# Patient Record
Sex: FEMALE | Race: WHITE | NOT HISPANIC OR LATINO | URBAN - METROPOLITAN AREA
[De-identification: names, ages, dates, MRNs, and addresses within clinical notes are randomized per-mention and may not be internally consistent; named-entity substitution may affect disease eponyms.]

---

## 2017-02-01 ENCOUNTER — OFFICE VISIT - HEALTHEAST (OUTPATIENT)
Dept: FAMILY MEDICINE | Facility: CLINIC | Age: 47
End: 2017-02-01

## 2017-02-01 DIAGNOSIS — M25.561 KNEE PAIN, BILATERAL: ICD-10-CM

## 2017-02-01 DIAGNOSIS — F33.0 DEPRESSION, MAJOR, RECURRENT, MILD (H): ICD-10-CM

## 2017-02-01 DIAGNOSIS — L40.50 PSORIATIC ARTHRITIS (H): ICD-10-CM

## 2017-02-01 DIAGNOSIS — M25.562 KNEE PAIN, BILATERAL: ICD-10-CM

## 2017-02-01 ASSESSMENT — MIFFLIN-ST. JEOR: SCORE: 1352.88

## 2017-02-19 ENCOUNTER — COMMUNICATION - HEALTHEAST (OUTPATIENT)
Dept: FAMILY MEDICINE | Facility: CLINIC | Age: 47
End: 2017-02-19

## 2017-03-08 ENCOUNTER — OFFICE VISIT - HEALTHEAST (OUTPATIENT)
Dept: FAMILY MEDICINE | Facility: CLINIC | Age: 47
End: 2017-03-08

## 2017-03-08 DIAGNOSIS — N92.0 MENORRHAGIA WITH REGULAR CYCLE: ICD-10-CM

## 2017-03-08 DIAGNOSIS — F33.0 DEPRESSION, MAJOR, RECURRENT, MILD (H): ICD-10-CM

## 2017-03-08 DIAGNOSIS — N89.8 VAGINAL DISCHARGE: ICD-10-CM

## 2017-03-08 DIAGNOSIS — Z12.4 SCREENING FOR CERVICAL CANCER: ICD-10-CM

## 2017-03-08 DIAGNOSIS — Z79.899 HIGH RISK MEDICATION USE: ICD-10-CM

## 2017-03-08 DIAGNOSIS — E55.9 VITAMIN D DEFICIENCY: ICD-10-CM

## 2017-03-08 DIAGNOSIS — Z00.00 ROUTINE GENERAL MEDICAL EXAMINATION AT A HEALTH CARE FACILITY: ICD-10-CM

## 2017-03-08 DIAGNOSIS — Z83.49 FAMILY HISTORY OF HYPOTHYROIDISM: ICD-10-CM

## 2017-03-08 DIAGNOSIS — Z23 NEED FOR VACCINATION: ICD-10-CM

## 2017-03-08 DIAGNOSIS — E78.2 MIXED HYPERLIPIDEMIA: ICD-10-CM

## 2017-03-08 DIAGNOSIS — L40.50 PSORIATIC ARTHRITIS (H): ICD-10-CM

## 2017-03-08 LAB
CHOLEST SERPL-MCNC: 264 MG/DL
FASTING STATUS PATIENT QL REPORTED: YES
HDLC SERPL-MCNC: 48 MG/DL
LDLC SERPL CALC-MCNC: 180 MG/DL
TRIGL SERPL-MCNC: 182 MG/DL

## 2017-03-08 ASSESSMENT — MIFFLIN-ST. JEOR: SCORE: 1376.12

## 2017-03-13 LAB
HPV INTERPRETATION - HISTORICAL: NORMAL
HPV INTERPRETER - HISTORICAL: NORMAL

## 2017-04-12 ENCOUNTER — OFFICE VISIT - HEALTHEAST (OUTPATIENT)
Dept: RHEUMATOLOGY | Facility: CLINIC | Age: 47
End: 2017-04-12

## 2017-04-12 DIAGNOSIS — L40.9 PSORIASIS: ICD-10-CM

## 2017-04-12 DIAGNOSIS — M25.50 POLYARTHRALGIA: ICD-10-CM

## 2017-04-12 DIAGNOSIS — Z79.899 HIGH RISK MEDICATION USE: ICD-10-CM

## 2017-04-12 DIAGNOSIS — L40.50 PSORIATIC ARTHRITIS (H): ICD-10-CM

## 2017-04-12 LAB
ALT SERPL W P-5'-P-CCNC: 20 U/L (ref 0–45)
CREAT SERPL-MCNC: 0.66 MG/DL (ref 0.6–1.1)
GFR SERPL CREATININE-BSD FRML MDRD: >60 ML/MIN/1.73M2

## 2017-06-20 ENCOUNTER — AMBULATORY - HEALTHEAST (OUTPATIENT)
Dept: LAB | Facility: CLINIC | Age: 47
End: 2017-06-20

## 2017-06-20 DIAGNOSIS — Z79.899 HIGH RISK MEDICATION USE: ICD-10-CM

## 2017-06-20 DIAGNOSIS — M25.50 POLYARTHRALGIA: ICD-10-CM

## 2017-06-20 DIAGNOSIS — L40.50 PSORIATIC ARTHRITIS (H): ICD-10-CM

## 2017-06-20 LAB
ALT SERPL W P-5'-P-CCNC: 19 U/L (ref 0–45)
CREAT SERPL-MCNC: 0.66 MG/DL (ref 0.6–1.1)
GFR SERPL CREATININE-BSD FRML MDRD: >60 ML/MIN/1.73M2

## 2017-07-31 ENCOUNTER — OFFICE VISIT - HEALTHEAST (OUTPATIENT)
Dept: FAMILY MEDICINE | Facility: CLINIC | Age: 47
End: 2017-07-31

## 2017-07-31 DIAGNOSIS — M19.90 ARTHRITIS: ICD-10-CM

## 2017-07-31 DIAGNOSIS — F33.0 DEPRESSION, MAJOR, RECURRENT, MILD (H): ICD-10-CM

## 2017-07-31 DIAGNOSIS — Z12.31 ENCOUNTER FOR SCREENING MAMMOGRAM FOR BREAST CANCER: ICD-10-CM

## 2017-07-31 DIAGNOSIS — R53.83 FATIGUE: ICD-10-CM

## 2017-07-31 ASSESSMENT — MIFFLIN-ST. JEOR: SCORE: 1373.57

## 2017-08-02 ENCOUNTER — AMBULATORY - HEALTHEAST (OUTPATIENT)
Dept: FAMILY MEDICINE | Facility: CLINIC | Age: 47
End: 2017-08-02

## 2017-08-17 ENCOUNTER — OFFICE VISIT - HEALTHEAST (OUTPATIENT)
Dept: RHEUMATOLOGY | Facility: CLINIC | Age: 47
End: 2017-08-17

## 2017-08-17 DIAGNOSIS — L40.50 PSORIATIC ARTHRITIS (H): ICD-10-CM

## 2017-08-17 DIAGNOSIS — Z79.899 HIGH RISK MEDICATION USE: ICD-10-CM

## 2017-08-17 DIAGNOSIS — L40.9 PSORIASIS: ICD-10-CM

## 2017-08-17 DIAGNOSIS — M25.50 POLYARTHRALGIA: ICD-10-CM

## 2017-08-17 LAB
ALT SERPL W P-5'-P-CCNC: 17 U/L (ref 0–45)
CREAT SERPL-MCNC: 0.67 MG/DL (ref 0.6–1.1)
GFR SERPL CREATININE-BSD FRML MDRD: >60 ML/MIN/1.73M2

## 2017-08-17 ASSESSMENT — MIFFLIN-ST. JEOR: SCORE: 1385.64

## 2017-10-11 ENCOUNTER — AMBULATORY - HEALTHEAST (OUTPATIENT)
Dept: LAB | Facility: CLINIC | Age: 47
End: 2017-10-11

## 2017-10-11 DIAGNOSIS — L40.50 PSORIATIC ARTHRITIS (H): ICD-10-CM

## 2017-10-11 DIAGNOSIS — Z79.899 HIGH RISK MEDICATION USE: ICD-10-CM

## 2017-10-11 DIAGNOSIS — M25.50 POLYARTHRALGIA: ICD-10-CM

## 2017-10-11 LAB
ALT SERPL W P-5'-P-CCNC: 18 U/L (ref 0–45)
CREAT SERPL-MCNC: 0.7 MG/DL (ref 0.6–1.1)
GFR SERPL CREATININE-BSD FRML MDRD: >60 ML/MIN/1.73M2

## 2017-11-30 ENCOUNTER — OFFICE VISIT - HEALTHEAST (OUTPATIENT)
Dept: FAMILY MEDICINE | Facility: CLINIC | Age: 47
End: 2017-11-30

## 2017-11-30 ENCOUNTER — AMBULATORY - HEALTHEAST (OUTPATIENT)
Dept: FAMILY MEDICINE | Facility: CLINIC | Age: 47
End: 2017-11-30

## 2017-11-30 ENCOUNTER — COMMUNICATION - HEALTHEAST (OUTPATIENT)
Dept: FAMILY MEDICINE | Facility: CLINIC | Age: 47
End: 2017-11-30

## 2017-11-30 ENCOUNTER — COMMUNICATION - HEALTHEAST (OUTPATIENT)
Dept: RHEUMATOLOGY | Facility: CLINIC | Age: 47
End: 2017-11-30

## 2017-11-30 DIAGNOSIS — L71.9 ROSACEA: ICD-10-CM

## 2017-11-30 DIAGNOSIS — L40.50 PSORIATIC ARTHRITIS (H): ICD-10-CM

## 2017-11-30 DIAGNOSIS — H61.23 EXCESSIVE EAR WAX, BILATERAL: ICD-10-CM

## 2017-11-30 DIAGNOSIS — E55.9 VITAMIN D DEFICIENCY: ICD-10-CM

## 2017-11-30 DIAGNOSIS — E66.9 OBESITY: ICD-10-CM

## 2017-11-30 ASSESSMENT — MIFFLIN-ST. JEOR: SCORE: 1367.05

## 2017-12-27 ENCOUNTER — COMMUNICATION - HEALTHEAST (OUTPATIENT)
Dept: RHEUMATOLOGY | Facility: CLINIC | Age: 47
End: 2017-12-27

## 2017-12-28 ENCOUNTER — COMMUNICATION - HEALTHEAST (OUTPATIENT)
Dept: ADMINISTRATIVE | Facility: CLINIC | Age: 47
End: 2017-12-28

## 2017-12-28 ENCOUNTER — COMMUNICATION - HEALTHEAST (OUTPATIENT)
Dept: RHEUMATOLOGY | Facility: CLINIC | Age: 47
End: 2017-12-28

## 2018-01-03 ENCOUNTER — AMBULATORY - HEALTHEAST (OUTPATIENT)
Dept: LAB | Facility: CLINIC | Age: 48
End: 2018-01-03

## 2018-01-03 DIAGNOSIS — Z79.899 HIGH RISK MEDICATION USE: ICD-10-CM

## 2018-01-03 DIAGNOSIS — M25.50 POLYARTHRALGIA: ICD-10-CM

## 2018-01-03 DIAGNOSIS — L40.50 PSORIATIC ARTHRITIS (H): ICD-10-CM

## 2018-01-03 LAB
ALBUMIN SERPL-MCNC: 3.8 G/DL (ref 3.5–5)
ALT SERPL W P-5'-P-CCNC: 22 U/L (ref 0–45)
CREAT SERPL-MCNC: 0.65 MG/DL (ref 0.6–1.1)
ERYTHROCYTE [DISTWIDTH] IN BLOOD BY AUTOMATED COUNT: 13.1 % (ref 11–14.5)
GFR SERPL CREATININE-BSD FRML MDRD: >60 ML/MIN/1.73M2
HCT VFR BLD AUTO: 40.5 % (ref 35–47)
HGB BLD-MCNC: 13.2 G/DL (ref 12–16)
MCH RBC QN AUTO: 28.4 PG (ref 27–34)
MCHC RBC AUTO-ENTMCNC: 32.6 G/DL (ref 32–36)
MCV RBC AUTO: 87 FL (ref 80–100)
PLATELET # BLD AUTO: 203 THOU/UL (ref 140–440)
PMV BLD AUTO: 6.5 FL (ref 7–10)
RBC # BLD AUTO: 4.65 MILL/UL (ref 3.8–5.4)
WBC: 6.2 THOU/UL (ref 4–11)

## 2018-01-30 ENCOUNTER — COMMUNICATION - HEALTHEAST (OUTPATIENT)
Dept: FAMILY MEDICINE | Facility: CLINIC | Age: 48
End: 2018-01-30

## 2018-01-30 DIAGNOSIS — L71.9 ROSACEA: ICD-10-CM

## 2018-02-01 ENCOUNTER — COMMUNICATION - HEALTHEAST (OUTPATIENT)
Dept: RHEUMATOLOGY | Facility: CLINIC | Age: 48
End: 2018-02-01

## 2018-02-01 DIAGNOSIS — L40.50 PSORIATIC ARTHRITIS (H): ICD-10-CM

## 2018-03-30 ENCOUNTER — HOSPITAL ENCOUNTER (OUTPATIENT)
Dept: MAMMOGRAPHY | Facility: CLINIC | Age: 48
Discharge: HOME | End: 2018-03-30
Attending: FAMILY MEDICINE

## 2018-03-30 DIAGNOSIS — Z12.31 ENCOUNTER FOR SCREENING MAMMOGRAM FOR BREAST CANCER: ICD-10-CM

## 2018-06-06 ENCOUNTER — COMMUNICATION - HEALTHEAST (OUTPATIENT)
Dept: RHEUMATOLOGY | Facility: CLINIC | Age: 48
End: 2018-06-06

## 2018-06-06 DIAGNOSIS — L40.50 PSORIATIC ARTHRITIS (H): ICD-10-CM

## 2018-07-03 ENCOUNTER — OFFICE VISIT - HEALTHEAST (OUTPATIENT)
Dept: RHEUMATOLOGY | Facility: CLINIC | Age: 48
End: 2018-07-03

## 2018-07-03 DIAGNOSIS — M25.50 POLYARTHRALGIA: ICD-10-CM

## 2018-07-03 DIAGNOSIS — L40.50 PSORIATIC ARTHRITIS (H): ICD-10-CM

## 2018-07-03 DIAGNOSIS — L40.9 PSORIASIS: ICD-10-CM

## 2018-07-03 LAB
C REACTIVE PROTEIN LHE: 0.9 MG/DL (ref 0–0.8)
ERYTHROCYTE [SEDIMENTATION RATE] IN BLOOD BY WESTERGREN METHOD: 7 MM/HR (ref 0–20)

## 2018-07-20 ENCOUNTER — OFFICE VISIT - HEALTHEAST (OUTPATIENT)
Dept: FAMILY MEDICINE | Facility: CLINIC | Age: 48
End: 2018-07-20

## 2018-07-20 DIAGNOSIS — E55.9 VITAMIN D DEFICIENCY: ICD-10-CM

## 2018-07-20 DIAGNOSIS — Z13.220 SCREENING, LIPID: ICD-10-CM

## 2018-07-20 DIAGNOSIS — R09.A2 GLOBUS SENSATION: ICD-10-CM

## 2018-07-20 DIAGNOSIS — L71.9 ROSACEA: ICD-10-CM

## 2018-07-20 DIAGNOSIS — F33.0 DEPRESSION, MAJOR, RECURRENT, MILD (H): ICD-10-CM

## 2018-07-20 DIAGNOSIS — L40.9 PSORIASIS: ICD-10-CM

## 2018-07-20 DIAGNOSIS — Z00.00 ROUTINE GENERAL MEDICAL EXAMINATION AT A HEALTH CARE FACILITY: ICD-10-CM

## 2018-07-20 DIAGNOSIS — R00.2 PALPITATIONS: ICD-10-CM

## 2018-07-20 LAB
ALBUMIN SERPL-MCNC: 4.4 G/DL (ref 3.5–5)
ALP SERPL-CCNC: 62 U/L (ref 45–120)
ALT SERPL W P-5'-P-CCNC: 21 U/L (ref 0–45)
ANION GAP SERPL CALCULATED.3IONS-SCNC: 9 MMOL/L (ref 5–18)
AST SERPL W P-5'-P-CCNC: 17 U/L (ref 0–40)
ATRIAL RATE - MUSE: 59 BPM
BILIRUB SERPL-MCNC: 0.4 MG/DL (ref 0–1)
BUN SERPL-MCNC: 12 MG/DL (ref 8–22)
CALCIUM SERPL-MCNC: 9.5 MG/DL (ref 8.5–10.5)
CHLORIDE BLD-SCNC: 106 MMOL/L (ref 98–107)
CHOLEST SERPL-MCNC: 238 MG/DL
CO2 SERPL-SCNC: 25 MMOL/L (ref 22–31)
CREAT SERPL-MCNC: 0.72 MG/DL (ref 0.6–1.1)
DIASTOLIC BLOOD PRESSURE - MUSE: NORMAL MMHG
ERYTHROCYTE [DISTWIDTH] IN BLOOD BY AUTOMATED COUNT: 13 % (ref 11–14.5)
FASTING STATUS PATIENT QL REPORTED: YES
GFR SERPL CREATININE-BSD FRML MDRD: >60 ML/MIN/1.73M2
GLUCOSE BLD-MCNC: 88 MG/DL (ref 70–125)
HCT VFR BLD AUTO: 42.7 % (ref 35–47)
HDLC SERPL-MCNC: 42 MG/DL
HGB BLD-MCNC: 14 G/DL (ref 12–16)
INTERPRETATION ECG - MUSE: NORMAL
LDLC SERPL CALC-MCNC: 165 MG/DL
MAGNESIUM SERPL-MCNC: 2.4 MG/DL (ref 1.8–2.6)
MCH RBC QN AUTO: 28.9 PG (ref 27–34)
MCHC RBC AUTO-ENTMCNC: 32.8 G/DL (ref 32–36)
MCV RBC AUTO: 88 FL (ref 80–100)
P AXIS - MUSE: 3 DEGREES
PLATELET # BLD AUTO: 216 THOU/UL (ref 140–440)
PMV BLD AUTO: 6.9 FL (ref 7–10)
POTASSIUM BLD-SCNC: 4.5 MMOL/L (ref 3.5–5)
PR INTERVAL - MUSE: 134 MS
PROT SERPL-MCNC: 7 G/DL (ref 6–8)
QRS DURATION - MUSE: 64 MS
QT - MUSE: 436 MS
QTC - MUSE: 431 MS
R AXIS - MUSE: 4 DEGREES
RBC # BLD AUTO: 4.86 MILL/UL (ref 3.8–5.4)
SODIUM SERPL-SCNC: 140 MMOL/L (ref 136–145)
SYSTOLIC BLOOD PRESSURE - MUSE: NORMAL MMHG
T AXIS - MUSE: 22 DEGREES
TRIGL SERPL-MCNC: 156 MG/DL
TSH SERPL DL<=0.005 MIU/L-ACNC: 1.4 UIU/ML (ref 0.3–5)
VENTRICULAR RATE- MUSE: 59 BPM
WBC: 6.2 THOU/UL (ref 4–11)

## 2018-07-20 ASSESSMENT — MIFFLIN-ST. JEOR: SCORE: 1356.61

## 2018-07-23 LAB
25(OH)D3 SERPL-MCNC: 28.5 NG/ML (ref 30–80)
25(OH)D3 SERPL-MCNC: 28.5 NG/ML (ref 30–80)

## 2018-10-08 ENCOUNTER — OFFICE VISIT - HEALTHEAST (OUTPATIENT)
Dept: RHEUMATOLOGY | Facility: CLINIC | Age: 48
End: 2018-10-08

## 2018-10-08 DIAGNOSIS — L40.9 PSORIASIS: ICD-10-CM

## 2018-10-08 DIAGNOSIS — M25.50 POLYARTHRALGIA: ICD-10-CM

## 2018-10-08 ASSESSMENT — MIFFLIN-ST. JEOR: SCORE: 1358.88

## 2019-02-19 ENCOUNTER — OFFICE VISIT - HEALTHEAST (OUTPATIENT)
Dept: FAMILY MEDICINE | Facility: CLINIC | Age: 49
End: 2019-02-19

## 2019-02-19 DIAGNOSIS — F33.0 DEPRESSION, MAJOR, RECURRENT, MILD (H): ICD-10-CM

## 2019-02-19 DIAGNOSIS — R35.0 URINARY FREQUENCY: ICD-10-CM

## 2019-02-19 DIAGNOSIS — K21.00 GASTROESOPHAGEAL REFLUX DISEASE WITH ESOPHAGITIS: ICD-10-CM

## 2019-02-19 DIAGNOSIS — E55.9 VITAMIN D DEFICIENCY: ICD-10-CM

## 2019-02-19 LAB
ALBUMIN UR-MCNC: NEGATIVE MG/DL
APPEARANCE UR: ABNORMAL
BILIRUB UR QL STRIP: NEGATIVE
COLOR UR AUTO: YELLOW
ERYTHROCYTE [DISTWIDTH] IN BLOOD BY AUTOMATED COUNT: 12.3 % (ref 11–14.5)
GLUCOSE UR STRIP-MCNC: NEGATIVE MG/DL
HCT VFR BLD AUTO: 43.1 % (ref 35–47)
HGB BLD-MCNC: 13.9 G/DL (ref 12–16)
HGB UR QL STRIP: NEGATIVE
KETONES UR STRIP-MCNC: NEGATIVE MG/DL
LEUKOCYTE ESTERASE UR QL STRIP: NEGATIVE
MCH RBC QN AUTO: 28.5 PG (ref 27–34)
MCHC RBC AUTO-ENTMCNC: 32.3 G/DL (ref 32–36)
MCV RBC AUTO: 88 FL (ref 80–100)
NITRATE UR QL: NEGATIVE
PH UR STRIP: 8.5 [PH] (ref 5–8)
PLATELET # BLD AUTO: 253 THOU/UL (ref 140–440)
PMV BLD AUTO: 6.4 FL (ref 7–10)
RBC # BLD AUTO: 4.88 MILL/UL (ref 3.8–5.4)
SP GR UR STRIP: 1.01 (ref 1–1.03)
UROBILINOGEN UR STRIP-ACNC: ABNORMAL
WBC: 8.5 THOU/UL (ref 4–11)

## 2019-02-19 RX ORDER — BIOTIN 1 MG
1000 TABLET ORAL DAILY
Status: SHIPPED | COMMUNITY
Start: 2019-02-19

## 2019-02-19 RX ORDER — LANOLIN ALCOHOL/MO/W.PET/CERES
400 CREAM (GRAM) TOPICAL DAILY
Status: SHIPPED | COMMUNITY
Start: 2019-02-19

## 2019-02-19 RX ORDER — FERROUS SULFATE 325(65) MG
1 TABLET ORAL
Status: SHIPPED | COMMUNITY
Start: 2019-02-19

## 2019-02-20 LAB
25(OH)D3 SERPL-MCNC: 40.2 NG/ML (ref 30–80)
25(OH)D3 SERPL-MCNC: 40.2 NG/ML (ref 30–80)
BACTERIA SPEC CULT: NO GROWTH

## 2019-10-01 ENCOUNTER — OFFICE VISIT - HEALTHEAST (OUTPATIENT)
Dept: FAMILY MEDICINE | Facility: CLINIC | Age: 49
End: 2019-10-01

## 2019-10-01 DIAGNOSIS — E55.9 VITAMIN D DEFICIENCY: ICD-10-CM

## 2019-10-01 DIAGNOSIS — H61.23 EXCESSIVE EAR WAX, BILATERAL: ICD-10-CM

## 2019-10-01 DIAGNOSIS — Z13.220 SCREENING, LIPID: ICD-10-CM

## 2019-10-01 DIAGNOSIS — R09.A2 GLOBUS SENSATION: ICD-10-CM

## 2019-10-01 DIAGNOSIS — K21.00 GASTROESOPHAGEAL REFLUX DISEASE WITH ESOPHAGITIS: ICD-10-CM

## 2019-10-01 DIAGNOSIS — Z12.31 VISIT FOR SCREENING MAMMOGRAM: ICD-10-CM

## 2019-10-01 DIAGNOSIS — L65.9 HAIR LOSS: ICD-10-CM

## 2019-10-01 DIAGNOSIS — R06.02 SHORT OF BREATH ON EXERTION: ICD-10-CM

## 2019-10-01 DIAGNOSIS — Z00.00 ROUTINE GENERAL MEDICAL EXAMINATION AT A HEALTH CARE FACILITY: ICD-10-CM

## 2019-10-01 LAB
ALBUMIN SERPL-MCNC: 4.4 G/DL (ref 3.5–5)
ALP SERPL-CCNC: 76 U/L (ref 45–120)
ALT SERPL W P-5'-P-CCNC: 16 U/L (ref 0–45)
ANION GAP SERPL CALCULATED.3IONS-SCNC: 12 MMOL/L (ref 5–18)
AST SERPL W P-5'-P-CCNC: 14 U/L (ref 0–40)
BILIRUB SERPL-MCNC: 0.3 MG/DL (ref 0–1)
BUN SERPL-MCNC: 16 MG/DL (ref 8–22)
CALCIUM SERPL-MCNC: 9.9 MG/DL (ref 8.5–10.5)
CHLORIDE BLD-SCNC: 105 MMOL/L (ref 98–107)
CHOLEST SERPL-MCNC: 280 MG/DL
CO2 SERPL-SCNC: 24 MMOL/L (ref 22–31)
CREAT SERPL-MCNC: 0.69 MG/DL (ref 0.6–1.1)
ERYTHROCYTE [DISTWIDTH] IN BLOOD BY AUTOMATED COUNT: 12.4 % (ref 11–14.5)
FASTING STATUS PATIENT QL REPORTED: NO
FERRITIN SERPL-MCNC: 50 NG/ML (ref 10–130)
GFR SERPL CREATININE-BSD FRML MDRD: >60 ML/MIN/1.73M2
GLUCOSE BLD-MCNC: 82 MG/DL (ref 70–125)
HCT VFR BLD AUTO: 42.7 % (ref 35–47)
HDLC SERPL-MCNC: 49 MG/DL
HGB BLD-MCNC: 14.8 G/DL (ref 12–16)
IRON SATN MFR SERPL: 25 % (ref 20–50)
IRON SERPL-MCNC: 87 UG/DL (ref 42–175)
LDLC SERPL CALC-MCNC: 180 MG/DL
MAGNESIUM SERPL-MCNC: 2 MG/DL (ref 1.8–2.6)
MCH RBC QN AUTO: 30.1 PG (ref 27–34)
MCHC RBC AUTO-ENTMCNC: 34.6 G/DL (ref 32–36)
MCV RBC AUTO: 87 FL (ref 80–100)
PLATELET # BLD AUTO: 233 THOU/UL (ref 140–440)
PMV BLD AUTO: 7 FL (ref 7–10)
POTASSIUM BLD-SCNC: 4.4 MMOL/L (ref 3.5–5)
PROT SERPL-MCNC: 7.2 G/DL (ref 6–8)
RBC # BLD AUTO: 4.9 MILL/UL (ref 3.8–5.4)
SODIUM SERPL-SCNC: 141 MMOL/L (ref 136–145)
TIBC SERPL-MCNC: 344 UG/DL (ref 313–563)
TRANSFERRIN SERPL-MCNC: 275 MG/DL (ref 212–360)
TRIGL SERPL-MCNC: 254 MG/DL
TSH SERPL DL<=0.005 MIU/L-ACNC: 0.97 UIU/ML (ref 0.3–5)
WBC: 6.5 THOU/UL (ref 4–11)

## 2019-10-01 RX ORDER — FAMOTIDINE 10 MG
10 TABLET ORAL 2 TIMES DAILY PRN
Qty: 60 TABLET | Refills: 5 | Status: SHIPPED | OUTPATIENT
Start: 2019-10-01

## 2019-10-01 ASSESSMENT — ANXIETY QUESTIONNAIRES
6. BECOMING EASILY ANNOYED OR IRRITABLE: SEVERAL DAYS
7. FEELING AFRAID AS IF SOMETHING AWFUL MIGHT HAPPEN: SEVERAL DAYS
5. BEING SO RESTLESS THAT IT IS HARD TO SIT STILL: SEVERAL DAYS
4. TROUBLE RELAXING: SEVERAL DAYS
IF YOU CHECKED OFF ANY PROBLEMS ON THIS QUESTIONNAIRE, HOW DIFFICULT HAVE THESE PROBLEMS MADE IT FOR YOU TO DO YOUR WORK, TAKE CARE OF THINGS AT HOME, OR GET ALONG WITH OTHER PEOPLE: SOMEWHAT DIFFICULT
1. FEELING NERVOUS, ANXIOUS, OR ON EDGE: SEVERAL DAYS
2. NOT BEING ABLE TO STOP OR CONTROL WORRYING: SEVERAL DAYS
GAD7 TOTAL SCORE: 7
3. WORRYING TOO MUCH ABOUT DIFFERENT THINGS: SEVERAL DAYS

## 2019-10-01 ASSESSMENT — PATIENT HEALTH QUESTIONNAIRE - PHQ9: SUM OF ALL RESPONSES TO PHQ QUESTIONS 1-9: 7

## 2019-10-01 ASSESSMENT — MIFFLIN-ST. JEOR: SCORE: 1343.23

## 2019-10-02 LAB
25(OH)D3 SERPL-MCNC: 30.9 NG/ML (ref 30–80)
25(OH)D3 SERPL-MCNC: 30.9 NG/ML (ref 30–80)

## 2019-10-09 ENCOUNTER — COMMUNICATION - HEALTHEAST (OUTPATIENT)
Dept: ADMINISTRATIVE | Facility: CLINIC | Age: 49
End: 2019-10-09

## 2019-10-25 ENCOUNTER — OFFICE VISIT - HEALTHEAST (OUTPATIENT)
Dept: OTOLARYNGOLOGY | Facility: CLINIC | Age: 49
End: 2019-10-25

## 2019-10-25 DIAGNOSIS — K21.9 LPRD (LARYNGOPHARYNGEAL REFLUX DISEASE): ICD-10-CM

## 2019-10-25 DIAGNOSIS — H61.23 BILATERAL IMPACTED CERUMEN: ICD-10-CM

## 2019-10-25 DIAGNOSIS — J37.0 CHRONIC LARYNGITIS: ICD-10-CM

## 2020-03-25 ENCOUNTER — COMMUNICATION - HEALTHEAST (OUTPATIENT)
Dept: FAMILY MEDICINE | Facility: CLINIC | Age: 50
End: 2020-03-25

## 2020-03-27 ENCOUNTER — COMMUNICATION - HEALTHEAST (OUTPATIENT)
Dept: FAMILY MEDICINE | Facility: CLINIC | Age: 50
End: 2020-03-27

## 2020-03-27 DIAGNOSIS — L40.9 PSORIASIS: ICD-10-CM

## 2020-03-30 RX ORDER — TRIAMCINOLONE ACETONIDE 1 MG/G
OINTMENT TOPICAL
Qty: 30 G | Refills: 1 | Status: SHIPPED | OUTPATIENT
Start: 2020-03-30

## 2020-03-31 ENCOUNTER — COMMUNICATION - HEALTHEAST (OUTPATIENT)
Dept: FAMILY MEDICINE | Facility: CLINIC | Age: 50
End: 2020-03-31

## 2020-03-31 DIAGNOSIS — L65.9 HAIR LOSS: ICD-10-CM

## 2020-03-31 RX ORDER — SPIRONOLACTONE 50 MG/1
50 TABLET, FILM COATED ORAL DAILY
Qty: 90 TABLET | Refills: 2 | Status: SHIPPED | OUTPATIENT
Start: 2020-03-31

## 2021-05-26 ASSESSMENT — PATIENT HEALTH QUESTIONNAIRE - PHQ9: SUM OF ALL RESPONSES TO PHQ QUESTIONS 1-9: 7

## 2021-05-28 ASSESSMENT — ANXIETY QUESTIONNAIRES: GAD7 TOTAL SCORE: 7

## 2021-05-30 VITALS — WEIGHT: 184 LBS | HEIGHT: 60 IN | BODY MASS INDEX: 36.12 KG/M2

## 2021-05-30 VITALS — WEIGHT: 187.8 LBS | BODY MASS INDEX: 36.68 KG/M2

## 2021-05-30 VITALS — HEIGHT: 59 IN | BODY MASS INDEX: 36.77 KG/M2 | WEIGHT: 182.38 LBS

## 2021-05-31 VITALS — HEIGHT: 60 IN | BODY MASS INDEX: 36.53 KG/M2 | WEIGHT: 186.1 LBS

## 2021-05-31 VITALS — HEIGHT: 60 IN | WEIGHT: 183.44 LBS | BODY MASS INDEX: 36.02 KG/M2

## 2021-05-31 VITALS — BODY MASS INDEX: 35.73 KG/M2 | WEIGHT: 182 LBS | HEIGHT: 60 IN

## 2021-06-01 VITALS — BODY MASS INDEX: 35.28 KG/M2 | HEIGHT: 60 IN | WEIGHT: 179.7 LBS

## 2021-06-01 VITALS — BODY MASS INDEX: 35.54 KG/M2 | WEIGHT: 182 LBS

## 2021-06-01 NOTE — PROGRESS NOTES
Assessment:     1. Routine general medical examination at a health care facility    2. Globus sensation    3. Short of breath on exertion    4. Hair loss    5. Visit for screening mammogram    6. Vitamin D deficiency    7. Excessive ear wax, bilateral    8. Gastroesophageal reflux disease with esophagitis    9. Screening, lipid        Plan:      1. Routine general medical examination at a health care facility  -Routine health maintenance discussion:  No smoking, limited alcohol (7 or less servings per week), 5 fruits/veg servings per day, 200 minutes of exercise per week.  Daily calcium/vitamin D guidelines, bone health, colon cancer screening beginning at age 50.  Accident avoidance, sun screen.     2. Globus sensation  -Updating labs as listed below, discussed possible etiologies including thyroid enlargement, reflux as the most likely.  Referral to ENT placed given the persistence of her symptoms as we did discuss this a few years ago and she is being treated for reflux.  - Comprehensive Metabolic Panel  - Thyroid [place]  - Ambulatory referral to ENT    3. Short of breath on exertion  -Hard to evaluate, she describes it almost as a shortness of breath more like there is something stuck in her throat and she is wheezing when she exercises.  Going to pursue the ENT referral first, and if there is nothing obvious present on exam with that then we can refer her for pulmonary function testing.  If her symptoms become more persistent or regular she should let me know and we can pursue pulmonary function testing sooner.    4. Hair loss  -Checking labs as listed below, treat as needed  - HM2(CBC w/o Differential)  - Ferritin  - Iron and Transferrin Iron Binding Capacity    5. Visit for screening mammogram  - Mammo Screening Bilateral; Future    6. Vitamin D deficiency  - Vitamin D, Total (25-Hydroxy)    7. Excessive ear wax, bilateral  -Recurrent issues with excessive earwax, referral to ENT placed and discussed she may  need microscopic removal of this.  - Ambulatory referral to ENT    8. Gastroesophageal reflux disease with esophagitis  -Updating labs as listed below, continue on famotidine and renewed today.  - Magnesium  - famotidine (FOR PEPCID) 10 MG tablet; Take 1 tablet (10 mg total) by mouth 2 (two) times a day as needed for heartburn.  Dispense: 60 tablet; Refill: 5    9. Screening, lipid  - Lipid [place]       Subjective:      Sierra Funk is a 49 y.o. female who presents for an annual exam. The patient is sexually active. The patient participates in regular exercise: yes. The patient reports that there is not domestic violence in her life.     She does have issues swallowing. She does at times feel like there is a lump in her throat. She will feel fluttering in her throat, maybe wheezing, after walking one mile. She does not feel short of breath, describes it more as a blockage in her throat. She hasn't needed the pepcid lately, doesn't feel heartburn on a regular basis.     She does note that it is harder for her to hear in the last few months. Her ears do pop at times and that helps quite a bit. She does find that when she first has her ears cleared out she hears too well.  She historically has needed to have her ears flushed in the past, the last time she had them flushed however it hurt a fair amount and she had some bleeding.    She did stop the fluoxetine shortly after she started it again. It made her really tired. She has been taking spinronolactone and she does feel that this is a bit better for her mood. She doesn't feel as anxious now with this. She does not feel like she needs to start anything right now. She did feel like she has more anxiety. She does note that the spironolactone does make her tired.     She is taking the iron for hair loss.     Healthy Habits:   Regular Exercise: Yes  Sunscreen Use: Yes  Healthy Diet: Yes  Dental Visits Regularly: Yes  Seat Belt: Yes  Sexually active: Yes  Self  Breast Exam Monthly:Yes  Hemoccults: No  Flex Sig: No  Colonoscopy: No  Lipid Profile: No  Glucose Screen: No  Prevention of Osteoporosis: No  Last Dexa: N/A  Guns at Home:  No      Immunization History   Administered Date(s) Administered     Dtap 1997     Hep A, Adult IM (19yr & older) 2015, 10/12/2016     Influenza, inj, historic,unspecified 2006, 11/15/2007, 10/29/2008     Influenza,seasonal quad, PF, =/> 6months 2017     Tdap 2009     Immunization status: up to date and documented, missing doses of flu and td.    No exam data present    Gynecologic History  No LMP recorded.  Contraception: condoms  Last Pap: 3/8/17. Results were: normal  Last mammogram: 3/30/18. Results were: normal      OB History    Para Term  AB Living   0 0 0 0 0 0   SAB TAB Ectopic Multiple Live Births   0 0 0 0         Current Outpatient Medications   Medication Sig Dispense Refill     aspirin-acetaminophen-caffeine (MIGRAINE RELIEF) 250-250-65 mg per tablet Take 1 tablet by mouth every 6 (six) hours as needed for pain.       biotin 1 mg tablet Take 1,000 mcg by mouth daily.       cholecalciferol, vitamin D3, (VITAMIN D3) 2,000 unit Tab Take 2,000 Units by mouth once.       ferrous sulfate 325 (65 FE) MG tablet Take 1 tablet by mouth.       folic acid (FOLVITE) 400 MCG tablet Take 400 mcg by mouth daily.       lidocaine-prilocaine (EMLA) cream Apply topically to affected areas as needed.  For external use only. 30 g 0     metroNIDAZOLE (METROCREAM) 0.75 % cream APPLY TO FACE TWICE DAILY 45 g 2     spironolactone (ALDACTONE) 100 MG tablet Take 100 mg by mouth daily.  3     tranexamic acid 650 mg Tab HALF A TABLET TWICE A DAY AS NEEDED FOR HEAVY PERIODS 30 tablet 4     triamcinolone (KENALOG) 0.1 % ointment Apply small amount to affected 2-3 times daily until gone 30 g 1     famotidine (FOR PEPCID) 10 MG tablet Take 1 tablet (10 mg total) by mouth 2 (two) times a day as needed for heartburn. 60  tablet 5     No current facility-administered medications for this visit.      Past Medical History:   Diagnosis Date     Depression      Excess ear wax      Vitamin D deficiency      Vitamin D deficiency      History reviewed. No pertinent surgical history.  Latex, natural rubber  Family History   Problem Relation Age of Onset     Leukemia Father          age 57     Hyperlipidemia Father      Anxiety disorder Father         disabled from work due to this     Hypertension Mother      Depression Mother      Hypothyroidism Sister      Depression Sister      Hyperthyroidism Sister      Depression Sister      Heart disease Paternal Grandmother          age 68     Arthritis Other      Breast cancer Maternal Aunt 62     Dementia Maternal Grandfather      Diabetes type I Paternal cousin      Social History     Socioeconomic History     Marital status: Single     Spouse name: Not on file     Number of children: 0     Years of education: Not on file     Highest education level: Not on file   Occupational History     Occupation: Clerical/office work     Employer: [employer]   Social Needs     Financial resource strain: Not on file     Food insecurity:     Worry: Not on file     Inability: Not on file     Transportation needs:     Medical: Not on file     Non-medical: Not on file   Tobacco Use     Smoking status: Never Smoker     Smokeless tobacco: Never Used     Tobacco comment: No exposure   Substance and Sexual Activity     Alcohol use: Yes     Alcohol/week: 3.3 standard drinks     Types: 4 Standard drinks or equivalent per week     Drug use: No     Sexual activity: Yes     Partners: Male     Birth control/protection: Condom   Lifestyle     Physical activity:     Days per week: Not on file     Minutes per session: Not on file     Stress: Not on file   Relationships     Social connections:     Talks on phone: Not on file     Gets together: Not on file     Attends Synagogue service: Not on file     Active  "member of club or organization: Not on file     Attends meetings of clubs or organizations: Not on file     Relationship status: Not on file     Intimate partner violence:     Fear of current or ex partner: Not on file     Emotionally abused: Not on file     Physically abused: Not on file     Forced sexual activity: Not on file   Other Topics Concern     Not on file   Social History Narrative    Steady BF since 2005       Review of Systems  Review of Systems      Grossly positive, please see the scanned form for full details    Objective:         Vitals:    10/01/19 1359   BP: 120/70   Pulse: 82   Temp: 98.3  F (36.8  C)   TempSrc: Oral   SpO2: 98%   Weight: 178 lb 8 oz (81 kg)   Height: 4' 11.5\" (1.511 m)     Body mass index is 35.45 kg/m .    Physical  Physical Exam     Gen: Well developed, well nourished, no acute distress.  HEENT: normocephalic/atraumatic, PERRLA/EOMI, TMs: Are blocked by earwax fully bilaterally, I did attempt with a curette to remove some of this earwax but there was copious amounts so I did abort this procedure, no nasal discharge.  Oral mucosa: no erythema/exudate  Neck: No LAD/masses/thyromegaly  Lungs: clear bilaterally  Heart: regular rate and rhythm, no murmurs/gallops/rubs  Breasts: symmetric, no masses/skin changes, nipple discharge, or axillary LAD.  BSE reviewed.  Abdomen: Normal bowel sounds, soft, non-tender, non-distended, no masses, neg Allen's/McBurney's, no rebound/guarding  Genital:deferred, no complaints  Lymphatics: no supraclavicular/axillary/epitrochlear/inguinal LAD. No edema.  Neuro: A&O x 3, CN II-XII intact, strength 5/5, reflexes symmetric, sensory intact to light touch.  Psych: Behavior appropriate, engaging.  Thought processes congruent, non-tangential.  Musculoskeletal: no gross deformities.  Skin: no rashes or lesions.     "

## 2021-06-02 VITALS — BODY MASS INDEX: 36.07 KG/M2 | WEIGHT: 184.7 LBS

## 2021-06-02 VITALS — WEIGHT: 180.2 LBS | BODY MASS INDEX: 35.38 KG/M2 | HEIGHT: 60 IN

## 2021-06-02 NOTE — PROGRESS NOTES
HPI: This patient is a 50 yo who presents for evaluation of the throat and cerumen at the request of Dr. Brown. There has been a globus sensation for several years, accompanied with occasional hoarseness and occasional dry cough.  She states she had a few episodes of feeling like her throat was closing this summer while walking, but this has improved. Does note some wheezing with walking and unsure if she has allergies or asthma. Has hard time swallowing nuts and chewy vitamins. Denies fevers, otalgia, weight loss, odynophagia, hemoptysis, and shortness of breath. Does not complain of sour taste, heartburn, or burping. Has taken reflux medication in the past, but not for several months.      Past medical history, surgical history, social history, family history, medications, and allergies have been reviewed with the patient and are documented above.    Review of Systems: a 10-system review was performed. Pertinent positives are noted in the HPI and on a separate scanned document in the chart.    PHYSICAL EXAMINATION:  GEN: no acute distress, normocephalic  EYES: extraocular movements are intact, pupils are equal and round. Sclera clear.   EARS: auricles are normally formed. The external auditory canals are clear with cerumen obstructing view of TM. Patient declined removal.  NOSE: anterior nares are patent. There are no masses or lesions. The septum is non-obstructing.  OC/OP: clear, dentition is in good repair. The tongue and palate are fully mobile and symmetric. The floor of mouth, base of tongue, and tonsils are soft and symmetric. Cobblestoning of the posterior pharyngeal wall.  HP/L (scope): nasopharynx, base of tongue, vallecula, epiglottis, and pyriform sinuses are clear. The bilateral vocal folds are mobile and without lesion. There is interarytenoid edema and erythema. Subglottic space open, without evidence of narrowing.  NECK: soft and supple. No lymphadenopathy or masses. Airway is midline.  NEURO: CN  VII and XII are intact and symmetric. alert and oriented.. No nystagmus. Gait is normal.  PULM: breathing comfortably on room air, normal chest expansion with respiration  HEART: No clubbing or cyanosis. no peripheral edema    MEDICAL DECISION-MAKING: This patient is a 48 yo with chronic laryngitis/globus sensation from acid reflux. Discussed diet and lifestyle changes, including weight loss, in addition to risks and benefits of H2 blocker vs PPI therapy.  As for her wheezing with activity, recommend she follow-up with pulmonology.  Offered to remove cerumen from bilateral EACs, but patient declined.  RTC prn.

## 2021-06-03 VITALS
BODY MASS INDEX: 35.05 KG/M2 | OXYGEN SATURATION: 98 % | WEIGHT: 178.5 LBS | DIASTOLIC BLOOD PRESSURE: 70 MMHG | TEMPERATURE: 98.3 F | SYSTOLIC BLOOD PRESSURE: 120 MMHG | HEART RATE: 82 BPM | HEIGHT: 60 IN

## 2021-06-07 NOTE — TELEPHONE ENCOUNTER
Medication Request  Medication name:   spironolactone (ALDACTONE) 100 MG tablet   3  1/17/2019      Sig - Route: Take 100 mg by mouth daily. - Oral     Class: Historical Med         Requested Pharmacy: [facility] [place]  Reason for request: This was previously prescribed by her Endocrinologist but patient thought that Dr. Brown said she would be able to prescribe it.   When did you use medication last?:  Patient has been out for a couple of weeks  Patient offered appointment:  N/A - electronic request  Okay to leave a detailed message: yes

## 2021-06-07 NOTE — TELEPHONE ENCOUNTER
Medication Request  Medication name: Fluoxetine 10 mg and Spironolactone 100 mg  Requested Pharmacy: [facility] #[id number]  Reason for request: New prescription   When did you use medication last?:  3/2019 and 1/2020  Patient offered appointment:  n/a  Okay to leave a detailed message: yes  [phone]    FYI: These medications are listed as historical medications or is not listed on the patient's current medication list.

## 2021-06-07 NOTE — TELEPHONE ENCOUNTER
RN cannot approve Refill Request    RN can NOT refill this medication med is not covered by policy/route to provider. Last office visit: 2/19/2019 Donna Brown MD Last Physical: 10/1/2019 Last MTM visit: Visit date not found Last visit same specialty: 2/19/2019 Donna Brown MD.  Next visit within 3 mo: Visit date not found  Next physical within 3 mo: Visit date not found      Ange Huston, [facility] Triage/Med Refill 3/27/2020    Requested Prescriptions   Pending Prescriptions Disp Refills     triamcinolone (KENALOG) 0.1 % ointment 30 g 1     Sig: Apply small amount to affected 2-3 times daily until gone       There is no refill protocol information for this order

## 2021-06-07 NOTE — TELEPHONE ENCOUNTER
Detailed message left on voicemail.     Last visit 10/1/2019. Chart notes state that she wasn't taking Fluoxetine and was feeling better on Spirolactone.     Pt should schedule phone visit for med check/follow up/refills to discuss when she restarted this medication and how she's doing on the current dose.     Pt asked to call back and schedule a phone visit with one of Dr Brown's partners.

## 2021-06-07 NOTE — TELEPHONE ENCOUNTER
Patient currently scheduled for a office visit on Friday 3/27/20 - Called and left patient a voicemail informing her that office visit are being changed to telephone visits to reduce the risk of COVID-19 exposure and spread. We will need to change patient's med check apt to a telephone visit.    Also Dr. Brown will be out ill of Friday 3/27/20 - if patient feels she needs to discuss medications this week, we can schedule with a covering provider. Otherwise will need to schedule two weeks out with Dr. Brown as next week, 3/30 - 4/3 is over booked.

## 2021-06-07 NOTE — TELEPHONE ENCOUNTER
Please let the patient know I sent in the refill for her, I did a 50 mg pill and will have her take one a day rather than cutting the 100 mg in half.

## 2021-06-07 NOTE — TELEPHONE ENCOUNTER
Called and left message for patient:     Who did she see for this medication? What dose is she taking? How long as she been on this.

## 2021-06-07 NOTE — TELEPHONE ENCOUNTER
"Called and spoke with the patient.    Spironolactone 100 mg tab, take a half tablet (50 mg) daily originally prescribed in Jan. 2019 for hair loss by Dr. Jagdish Kramer, endocrinologist out of [facility] [place].     Patient no longer needing to see endocrinology therefore hoping Dr. Brown will continue to prescribe. Inquired if patient feels the prescription is helping with hair loss, patient stated \"no\" but wanting to continue on it because it actually helps with her arthritis pain.              "

## 2021-06-09 NOTE — PROGRESS NOTES
Assessment:  This is a 46 y.o.female who presents for evaluation of knee pain.  It potentially may be a combination of pain from injury, osteoarthritis, and psoriatic arthritis.  She feels she would like to increase the dose of her fluoxetine for treatment of her chronic depression and anxiety.      1. Knee pain, bilateral     2. Depression, major, recurrent, mild  FLUoxetine (PROZAC) 20 MG capsule   3. Psoriatic arthritis  Ambulatory referral to Rheumatology         Plan:  I have increased her fluoxetine to 20 mg daily.  She'll let me know if she has any problems with that dose or if she feels it is not effective.  Her previous rheumatologist has left our system and she has not yet attempted to establish care with a new rheumatologist.  I have put in a new referral for her so she can get the ball rolling to get set up for an appointment.  In the meantime I encouraged her to get regular activity that maintains range of motion in her knees without stressing the joints excessively, such as walking, swimming, or bicycling.  She has a physical exam coming up in a month and we can follow-up with her at that time.  She should call in the meantime if she has any problems or questions.        Subjective:  This is a 46 y.o.female who presents with a concern about bilateral knee pain.  Just over a month ago she fell on her knees after slipping on the ice.  Since then she's been noticing a grinding sensation in the left knee, although she says it was present to a slight degree prior to her fall.  She also has a slight click in the right knee.  She says 5 years ago she injured her left knee when she stopped suddenly while running.  Last week she pulled a muscle in her right calf.  That is slowly improving.  She says her knees feel stiff but currently have only a little pain.  They have not been red or swollen.  They do not lock or feel unstable.  Also she has been very irritable lately and feels like the 10 mg of fluoxetine  "is not maintaining adequate control of her depression and anxiety.      Objective:      Visit Vitals     /82     Pulse 68     Ht 4' 11\" (1.499 m)     Wt 182 lb 6 oz (82.7 kg)     Breastfeeding No     BMI 36.84 kg/m2     History   Smoking Status     Never Smoker   Smokeless Tobacco     Never Used     Comment: No exposure       Physical Exam:   The patient is obese which limits assessment of the knee joints to some degree.  There is no obvious swelling, erythema, or deformity.  She has full range of motion bilaterally.  She has negative patellar apprehension and anterior and posterior drawer signs.  I do not elicit any instability on exam and there is no sensation of grinding or crepitus.  There is a little mild peripatellar tenderness bilaterally.    Her PHQ-9 score is 8.  She denies suicidal ideation or plan.  She rates her daily function as somewhat difficult.  Her LETITIA-7 score is 8.          Current Outpatient Prescriptions on File Prior to Visit   Medication Sig Dispense Refill     aspirin-acetaminophen-caffeine (MIGRAINE RELIEF) 250-250-65 mg per tablet Take 1 tablet by mouth every 6 (six) hours as needed for pain.       desonide (DESOWEN) 0.05 % cream Apply to affected area 2 times daily 60 g 5     ergocalciferol (VITAMIN D2) 50,000 unit capsule Take 1 capsule (50,000 Units total) by mouth once a week. 12 capsule 1     lidocaine-prilocaine (EMLA) cream Apply topically to affected areas as needed.  For external use only. 30 g 0     omega 3-dha-epa-fish oil (FISH OIL) 1,600-500-800 mg/5 mL Liqd Take by mouth.       tranexamic acid 650 mg Tab HALF A TABLET TWICE A DAY AS NEEDED FOR HEAVY PERIODS 30 tablet 4     No current facility-administered medications on file prior to visit.            Gabi Simons M.D.      This note has been dictated using voice recognition software.  Any grammatical, typographical, or context distortions are unintentional and inherent to the software.  The patient to go through " express that waiting in the fall

## 2021-06-09 NOTE — PROGRESS NOTES
ASSESSMENT:     Healthy female exam.     1. Routine general medical examination at a health care facility     2. Screening for cervical cancer  Gynecologic Cytology (PAP Smear)   3. Vitamin D deficiency  Vitamin D, Total (25-Hydroxy)   4. Mixed hyperlipidemia  Lipid Cascade   5. High risk medication use  Comprehensive Metabolic Panel    HM1(CBC and Differential)    HM1 (CBC with Diff)    Erythrocyte Sedimentation Rate   6. Family history of hypothyroidism  Thyroid Jordan   7. Need for vaccination  Influenza, Seasonal Quad, Preservative Free 36+ Months   8. Vaginal discharge  Wet Prep, Vaginal   9. Psoriatic arthritis  methotrexate 2.5 MG tablet   10. Menorrhagia with regular cycle     11. Depression, major, recurrent, mild          PLAN:    She was given an influenza immunization today and I recommended that she get one annually, particularly in light of the fact that she is on immunosuppressive medication.  She will not be seeing her new rheumatologist until April so I have refilled her methotrexate for another 8 weeks.  We will recheck her vitamin D level today and see how long she will need to continue the high-dose supplements.  I will renew the tranexamic acid for her menorrhagia and dysmenorrhea when due.  I recommended that she try using the desonide cream externally for her vulvar dermatitis, since regardless of whether it is psoriatic dermatitis or lichen sclerosis, it should respond to the desonide.  She is doing well on the fluoxetine at the current dose and we will continue to refill that.  If her Pap and HPV are negative today she can go 5 years until her next screening Pap and HPV tests.  She is currently exercising just 5 minutes a day 5 times a week, and I strongly recommended that she increase to at least 30 minutes at least 5 times a week.  She says she is allergic to chlorine and gets folliculitis from bicycle seats so swimming and bicycling are not options for her.  She certainly could still  walk regularly.  She gets some fruits and vegetables but should aim for at least 5 servings per day.  She should try to decrease her intake with a goal to lose weight.  I will follow-up with her when her lab results are available.  Her next complete exam should be in 1 year.    I have had an Advance Directives discussion with the patient.    SUBJECTIVE:      Sierra Funk is a 46 y.o. female who presents for an annual exam.  The patient is sexually active. The patient reports that there is not domestic violence in her life.     Healthy Habits:   Regular Exercise: Yes  Sunscreen Use: Yes  Healthy Diet: Yes  Seat Belt: Yes  Self Breast Exam Monthly:Yes  Colonoscopy: No  Lipid Profile: Yes  Glucose Screen: Yes  Prevention of Osteoporosis: No  Last Dexa: No  Guns at Home:  No     Gynecologic History  LMP: 2/15/2017  Contraception: condoms  Last Pap: 5/2014. Results were: normal  Last mammogram: 6/25/15. Results were: normal  History of abnormal Pap:  Early 1990's mild dysplasia, colposcopy done but no treatment needed    Current Outpatient Prescriptions   Medication Sig Dispense Refill     aspirin-acetaminophen-caffeine (MIGRAINE RELIEF) 250-250-65 mg per tablet Take 1 tablet by mouth every 6 (six) hours as needed for pain.       ergocalciferol (VITAMIN D2) 50,000 unit capsule Take 1 capsule (50,000 Units total) by mouth once a week. 12 capsule 1     FLUoxetine (PROZAC) 20 MG capsule Take 1 capsule (20 mg total) by mouth daily. 90 capsule 1     lidocaine-prilocaine (EMLA) cream Apply topically to affected areas as needed.  For external use only. 30 g 0     methotrexate 2.5 MG tablet Take 6 tablets once q week 48 tablet 0     omega 3-dha-epa-fish oil (FISH OIL) 1,600-500-800 mg/5 mL Liqd Take by mouth.       desonide (DESOWEN) 0.05 % cream Apply to affected area 2 times daily 60 g 5     tranexamic acid 650 mg Tab HALF A TABLET TWICE A DAY AS NEEDED FOR HEAVY PERIODS 30 tablet 4     No current facility-administered  medications for this visit.      Allergies:  Latex, natural rubber    Patient Active Problem List   Diagnosis     Psoriasis     Obesity     Menorrhagia     PMS (premenstrual syndrome)     Allergic rhinitis     Mixed hyperlipidemia     Headache, chronic daily     Rosacea     Vitamin D deficiency     Neoplasm of uncertain behavior     Depression, major, recurrent, mild     Carpal tunnel syndrome, bilateral     Arthritis     High risk medication use     Psoriatic arthritis       Past Medical History:   Diagnosis Date     Vitamin D deficiency        OB History      Para Term  AB TAB SAB Ectopic Multiple Living    0 0 0 0 0 0 0 0 0 0          History reviewed. No pertinent surgical history.    Family History   Problem Relation Age of Onset     Leukemia Father       age 57     Hyperlipidemia Father      Anxiety disorder Father      disabled from work due to this     Hypertension Mother      Hypothyroidism Sister      Hypothyroidism Sister      Heart disease Paternal Grandmother       age 68     Arthritis       Breast cancer Maternal Aunt 62       Social History     Social History     Marital status: Single     Spouse name: N/A     Number of children: 0     Years of education: N/A     Occupational History     Clerical/office work [employer]     Social History Main Topics     Smoking status: Never Smoker     Smokeless tobacco: Never Used      Comment: No exposure     Alcohol use 2.0 oz/week     4 Standard drinks or equivalent per week     Drug use: No     Sexual activity: Yes     Partners: Male     Birth control/ protection: Condom     Other Topics Concern     Not on file     Social History Narrative    Steady BF since        Review of Systems  A 12 point comprehensive review of systems was negative except as noted:    She filled out her review of systems paperwork today and I have scanned it into epic.  It is moderately pan-positive.  In addition to her chronic complaints of psoriasis,  depression, and heavy menses, she has a couple of other concerns.  She said earlier this week she had 2 days of cramping on the right side of her abdomen.  It has been better for the last couple of days.  She complains of dry eyes, visual changes, sensitivity to light and eyes tiring easily.  She last had an eye exam in September and is using Systane lubricant eyedrops.  Her periods are regular and monthly.  They last 5 days.  For 2 of those days she says her bleeding is heavy enough that she almost has to change a pad every half an hour.  It has been this way for 10 years and she was previously worked up for that at [facility].  She has been using tranexamic acid for the severe cramps and bleeding for a number of years since she was started on that at [facility].  Her previous rheumatologist has left the system and she has an appointment with a new rheumatologist in April.  She needs her methotrexate refilled until then.  She feels her fluoxetine is doing a good job to control her anxiety and depression.  Her past history and family history were reviewed and she denies any updates.  She is still with the same boyfriend for the last 12 years but has no plans to  and says he would probably drive her nuts if they did.       OBJECTIVE:        Visit Vitals     /84 (Patient Site: Left Arm, Patient Position: Sitting, Cuff Size: Adult Large)     Pulse 82     Temp 97.7  F (36.5  C) (Oral)     Ht 5' (1.524 m)     Wt 184 lb (83.5 kg)     BMI 35.94 kg/m2     History   Smoking Status     Never Smoker   Smokeless Tobacco     Never Used     Comment: No exposure       Physical Exam:  General appearance - alert, well appearing, and in no distress  Eyes - pupils equal and reactive, extraocular eye movements intact  Ears - bilateral TM's and external ear canals normal  Nose - normal and patent, no erythema, discharge or polyps  Mouth - mucous membranes moist, pharynx normal without lesions  Neck - supple, no significant  adenopathy. Thyroid normal and without palpable nodules or masses  Lymphatics - no palpable lymphadenopathy  Chest - clear to auscultation, no wheezes, rales or rhonchi, symmetric air entry  Heart - normal rate, regular rhythm, normal S1, S2, no murmurs, rubs, clicks or gallops  Abdomen - soft, nontender, nondistended, no masses or organomegaly  Breasts - breasts appear normal, no suspicious masses, no skin or nipple changes or axillary nodes  Pelvic - normal vagina, cervix, uterus and adnexa.  Increased thick white vaginal discharge, wet prep negative.  Whitish coloration of vulva bilaterally c/w psoriasis vs lichen sclerosus.    Back exam - full range of motion, no tenderness, palpable spasm or pain on motion  Neurological - alert, oriented, normal speech, no focal findings or movement disorder noted, cranial nerves II through XII intact  Musculoskeletal - no joint tenderness, deformity or swelling, full range of motion without pain  Extremities - peripheral pulses normal, no pedal edema, no clubbing or cyanosis  Skin - normal coloration and turgor, no suspicious skin lesions noted.  Areas of psoriasis on elbows and posterior neck at hairline.       Her PHQ 9 score is 6.  She denies suicidal ideation or plan.  She rates her daily functioning is somewhat difficult.  Her LETITIA 7 score is 8.       Results for orders placed or performed in visit on 03/08/17   Wet Prep, Vaginal   Result Value Ref Range    Yeast Result No yeast seen No yeast seen    Trichomonas No Trichomonas seen No Trichomonas seen    Clue Cells, Wet Prep No Clue cells seen No Clue cells seen   HM1 (CBC with Diff)   Result Value Ref Range    WBC 6.5 4.0 - 11.0 thou/uL    RBC 4.61 3.80 - 5.40 mill/uL    Hemoglobin 13.6 12.0 - 16.0 g/dL    Hematocrit 41.0 35.0 - 47.0 %    MCV 89 80 - 100 fL    MCH 29.5 27.0 - 34.0 pg    MCHC 33.2 32.0 - 36.0 g/dL    RDW 13.3 11.0 - 14.5 %    Platelets 224 140 - 440 thou/uL    MPV 6.7 (L) 7.0 - 10.0 fL    Neutrophils % 51  50 - 70 %    Lymphocytes % 34 20 - 40 %    Monocytes % 10 2 - 10 %    Eosinophils % 5 0 - 6 %    Basophils % 1 0 - 2 %    Neutrophils Absolute 3.3 2.0 - 7.7 thou/uL    Lymphocytes Absolute 2.2 0.8 - 4.4 thou/uL    Monocytes Absolute 0.7 0.0 - 0.9 thou/uL    Eosinophils Absolute 0.3 0.0 - 0.4 thou/uL    Basophils Absolute 0.1 0.0 - 0.2 thou/uL   Erythrocyte Sedimentation Rate   Result Value Ref Range    Sed Rate 4 0 - 20 mm/hr         Gabi Simons M.D.

## 2021-06-10 NOTE — PROGRESS NOTES
ASSESSMENT AND PLAN:  Sierra Funk 46 y.o. female is seen here on 04/12/17 for evaluation of polyarthralgias in the background of psoriatic arthritis, psoriasis.  She may have subtle chronic dactylitis of the left second toe.  I have asked her to increase methotrexate to 20 mg per week.  We talked about contraception.  She is going to check with her primary physician if she is a candidate for IUD as she does not want to be on oral contraceptive pills.  We talked about the reproductive aspects and taking methotrexate and contraindication there off.  I have asked her to return for follow-up this time in 2 months or sooner.  Diagnoses and all orders for this visit:    Psoriatic arthritis  -     folic acid (FOLVITE) 1 MG tablet; Take 1 tablet (1 mg total) by mouth daily.  Dispense: 30 tablet; Refill: 11  -     methotrexate 2.5 MG tablet; Take 8 tablets (20 mg total) by mouth once a week.  Dispense: 32 tablet; Refill: 1  -     ALT (SGPT); Standing  -     Albumin; Standing  -     Creatinine; Standing  -     HM2(CBC w/o Differential); Standing  -     ALT (SGPT)  -     Albumin  -     Creatinine  -     HM2(CBC w/o Differential)    Psoriasis    High risk medication use  -     folic acid (FOLVITE) 1 MG tablet; Take 1 tablet (1 mg total) by mouth daily.  Dispense: 30 tablet; Refill: 11  -     ALT (SGPT); Standing  -     Albumin; Standing  -     Creatinine; Standing  -     HM2(CBC w/o Differential); Standing  -     ALT (SGPT)  -     Albumin  -     Creatinine  -     HM2(CBC w/o Differential)    Polyarthralgia  -     Rheumatoid Factor Quant  -     CCP Antibodies  -     ALT (SGPT); Standing  -     Albumin; Standing  -     Creatinine; Standing  -     HM2(CBC w/o Differential); Standing  -     ALT (SGPT)  -     Albumin  -     Creatinine  -     HM2(CBC w/o Differential)      HISTORY OF PRESENTING ILLNESS:  Sierra Funk, 46 y.o., female is here for establishment of care for psoriatic arthritis and evaluation of ongoing  polyarthralgias.  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  She reports 2 years long history of joint pains having started in the right foot where she was found to have arthropathy of the metacarpophalangeal joints.. It a runny nose, she has a history of difficulty swallowing.  She gets and that his dizziness and headaches.  She was on birth control pills but that caused her too much moodiness.  She is currently using only condoms.  Ppears that she had enthesopathy as well such as I iliac.  She does not seem to have had dactylitis.  No ocular involvement.  She is on methotrexate 15 mg for the past 2 years.  She has tolerated this well.  Recent labs are reviewed within acceptable range.  She is on folic acid.  She noted that she still has residual pain.  She points to the DIPs and PIPs as well as the MTPs.  This is associated with pain level of moderate severity.  This is more when she is active.  She rated this 5.0/10.  Morning stiffness is 30 minutes.  She has noted fatigue and generalized weakness, she has had itching of the eyes ringing in the ears.  She used birth control pills and that caused her effective issues.  Currently there is only using condoms.  There is no family history of psoriatic arthritis, psoriasis inflammatory bowel disease rheumatoid arthritis mom has arthritis of unknown determination.  Further historical information and ADL limitations as noted in the multidimensional health assessment questionnaire attached in the EMR. Rest of the 13 system ROS is negative.     ALLERGIES:Latex, natural rubber    PAST MEDICAL/ACTIVE PROBLEMS/MEDICATION/ FAMILY HISTORY/SOCIAL DATA:  The patient has a family history of  Past Medical History:   Diagnosis Date     Vitamin D deficiency      History   Smoking Status     Never Smoker   Smokeless Tobacco     Never Used     Comment: No exposure     Patient Active Problem List   Diagnosis     Psoriasis     Obesity     Menorrhagia     PMS  (premenstrual syndrome)     Allergic rhinitis     Mixed hyperlipidemia     Headache, chronic daily     Rosacea     Vitamin D deficiency     Neoplasm of uncertain behavior     Depression, major, recurrent, mild     Carpal tunnel syndrome, bilateral     Arthritis     High risk medication use     Psoriatic arthritis     Current Outpatient Prescriptions   Medication Sig Dispense Refill     aspirin-acetaminophen-caffeine (MIGRAINE RELIEF) 250-250-65 mg per tablet Take 1 tablet by mouth every 6 (six) hours as needed for pain.       desonide (DESOWEN) 0.05 % cream Apply to affected area 2 times daily 60 g 5     ergocalciferol (VITAMIN D2) 50,000 unit capsule Take 1 capsule (50,000 Units total) by mouth once a week. 12 capsule 1     FLUoxetine (PROZAC) 20 MG capsule Take 1 capsule (20 mg total) by mouth daily. 90 capsule 1     methotrexate 2.5 MG tablet Take 8 tablets (20 mg total) by mouth once a week. 32 tablet 1     omega 3-dha-epa-fish oil (FISH OIL) 1,600-500-800 mg/5 mL Liqd Take by mouth.       folic acid (FOLVITE) 1 MG tablet Take 1 tablet (1 mg total) by mouth daily. 30 tablet 11     lidocaine-prilocaine (EMLA) cream Apply topically to affected areas as needed.  For external use only. 30 g 0     tranexamic acid 650 mg Tab HALF A TABLET TWICE A DAY AS NEEDED FOR HEAVY PERIODS 30 tablet 4     No current facility-administered medications for this visit.        COMPREHENSIVE EXAMINATION:  Vitals:    04/12/17 0955   BP: 114/70   Pulse: 80   SpO2: 97%   Weight: 187 lb 12.8 oz (85.2 kg)     A well appearing alert oriented female. Vital data as noted above. Her eyes without inflammation/scleromalacia. ENTwithout oral mucositis, thrush, nasal deformity, external ear redness, deformity. Her neck is without lymphadenopathy and supple. Lungs normal sounds, no pleural rub. Heart auscultation normal rate, rhythm; no pericardial rub and murmurs. Abdomen soft, non tender, no organomegaly. Skin examined for heliotrope, malar area  eruption, lupus pernio, periungual erythema, sclerodactyly, papules, erythema nodosum, purpura, nail pitting, onycholysis, and obvious psoriasis lesion. Neurological examination shows normal alertness, speech, facial symmetry, tone and power in upper and lower extremities, Tinel's and Phalen's at wrist and gait. The joint examination is performed for swelling, tenderness, warmth, erythema, and range of motion in the following joints: DIPs, PIPs, MCPs, wrists, first CMC's, elbows, shoulders, hips, knees, ankles, feet; spine for range of motion and paraspinal muscles for tenderness. The salient normal / abnormal findings are appended.  She has tenderness in the DIPs, the right second MTP.  There is a hint of i.e. subtle swelling of the left second toe raising the question of chronic dactylitis. This is not tender.  She does not have enthesitis such as a Achilles, costochondral, iliac.  She has nail pitting and onycholysis.  She has psoriatic lesion such as on her elbows.  LAB / IMAGING DATA:  ALT   Date Value Ref Range Status   03/08/2017 16 0 - 45 U/L Final   04/05/2016 40 0 - 45 U/L Final   01/06/2016 17 0 - 45 U/L Final     Albumin   Date Value Ref Range Status   03/08/2017 4.1 3.5 - 5.0 g/dL Final   04/05/2016 4.1 3.5 - 5.0 g/dL Final   01/06/2016 4.1 3.5 - 5.0 g/dL Final     Creatinine   Date Value Ref Range Status   03/08/2017 0.69 0.60 - 1.10 mg/dL Final   04/05/2016 0.67 0.60 - 1.10 mg/dL Final   01/06/2016 0.64 0.60 - 1.10 mg/dL Final       WBC   Date Value Ref Range Status   03/08/2017 6.5 4.0 - 11.0 thou/uL Final   04/05/2016 6.3 4.0 - 11.0 thou/uL Final     Hemoglobin   Date Value Ref Range Status   03/08/2017 13.6 12.0 - 16.0 g/dL Final   04/05/2016 14.0 12.0 - 16.0 g/dL Final   01/06/2016 13.6 12.0 - 16.0 g/dL Final     Platelets   Date Value Ref Range Status   03/08/2017 224 140 - 440 thou/uL Final   04/05/2016 252 140 - 440 thou/uL Final   01/06/2016 268 140 - 440 thou/uL Final       Lab Results    Component Value Date    SEDRATE 4 03/08/2017

## 2021-06-12 NOTE — PROGRESS NOTES
PROGRESS NOTE       SUBJECTIVE:  Sierra Funk is a 46 y.o. female   Chief Complaint   Patient presents with     Establish Care     Change from Dr Simons     Skin Problem     bumps on elbows x 1 month     Medication Management     ? re dose of Fluoxetine - would like vit D Level checked   This patient has a history of psoriatic arthritis.  She is under the care of rheumatologist Dr. León.  She realizes her workup has been extensive, and even though she is never noted a tick bite, she requests that a Lyme titer be done.      Patient has had mild chronic depression and does well with Prozac.  She has most recently been on 20 mg daily but has noted a weight gain.  She states she was able to lose weight when she was on 10 mg.  Her symptoms of depression have been under control and she would like to reduce her current 20 mg dose.    Patient's last mammogram was 6/25/2015.  I have recommended yearly screening mammography.  A maternal aunt had breast cancer.    Patient also requests her vitamin D be rechecked.  She has been taking 2000 units of D3 daily.  In March her vitamin D level was 26.5.      Patient Active Problem List   Diagnosis     Psoriasis     Obesity     Menorrhagia     PMS (premenstrual syndrome)     Allergic rhinitis     Mixed hyperlipidemia     Headache, chronic daily     Rosacea     Vitamin D deficiency     Neoplasm of uncertain behavior     Depression, major, recurrent, mild     Carpal tunnel syndrome, bilateral     High risk medication use     Psoriatic arthritis     Polyarthralgia       Current Outpatient Prescriptions   Medication Sig Dispense Refill     aspirin-acetaminophen-caffeine (MIGRAINE RELIEF) 250-250-65 mg per tablet Take 1 tablet by mouth every 6 (six) hours as needed for pain.       desonide (DESOWEN) 0.05 % cream Apply to affected area 2 times daily 60 g 5     ergocalciferol (VITAMIN D2) 50,000 unit capsule Take 1 capsule (50,000 Units total) by mouth once a week. 12 capsule 1      folic acid (FOLVITE) 1 MG tablet Take 1 tablet (1 mg total) by mouth daily. 30 tablet 11     lidocaine-prilocaine (EMLA) cream Apply topically to affected areas as needed.  For external use only. 30 g 0     omega 3-dha-epa-fish oil (FISH OIL) 1,600-500-800 mg/5 mL Liqd Take by mouth.       tranexamic acid 650 mg Tab HALF A TABLET TWICE A DAY AS NEEDED FOR HEAVY PERIODS 30 tablet 4     FLUoxetine (PROZAC) 10 MG capsule Take 9 capsules (90 mg total) by mouth daily. 30 capsule 3     methotrexate 2.5 MG tablet Take 8 tablets (20 mg total) by mouth once a week. 32 tablet 1     No current facility-administered medications for this visit.        History   Smoking Status     Never Smoker   Smokeless Tobacco     Never Used     Comment: No exposure       REVIEW OF SYSTEMS:  Patient denies fever, chills, dizziness, headache, visual change, cough, chest pain, shortness of breath, abdominal pain, edema.       OBJECTIVE:       Vitals:    07/31/17 1505   BP: 136/84   Pulse: 80   Resp: 16     Weight: 183 lb 7 oz (83.2 kg)  Wt Readings from Last 3 Encounters:   07/31/17 183 lb 7 oz (83.2 kg)   04/12/17 187 lb 12.8 oz (85.2 kg)   03/08/17 184 lb (83.5 kg)     Body mass index is 35.83 kg/(m^2).        Physical Exam:  GENERAL APPEARANCE: A&A, NAD, well hydrated, well nourished  SKIN:  Normal skin turgor, mild psoriasis over the elbows  NECK: Supple, without lymphadenopathy, no thyroid mass  CV: RRR, no M/G/R   LUNGS: CTAB, normal respiratory effort  EXTREMITY: no edema   NEURO: no gross deficits   PSYCHIATRIC:  Mood appropriate, memory intact    Julien 7 score is 9  PHQ 9 score is 7    ASSESSMENT/PLAN:   1. Encounter for screening mammogram for breast cancer  Stressed the importance of yearly mammography.  - Mammo Screening Bilateral; Future    2. Arthritis  We will complete the workup by ordering a Lyme titer.  - Lyme Antibody Cascade    3. Depression, major, recurrent, mild  We talked about eating healthy and making sure she is getting  adequate exercise.  I am perfectly comfortable reducing her dose to 10 mg as long as she keeps track of symptoms of depression.  - FLUoxetine (PROZAC) 10 MG capsule; Take 9 capsules (90 mg total) by mouth daily.  Dispense: 30 capsule; Refill: 3    4. Fatigue  I have asked her to increase her vitamin D3 to 4000 units daily and recheck vitamin D in 3-6 months.  - 1,25 Dihydroxyvitamin D, Serum; Future        There are no Patient Instructions on file for this visit.  Medications Discontinued During This Encounter   Medication Reason     FLUoxetine (PROZAC) 20 MG capsule Dose adjustment     Follow up in 3 months.    The visit lasted a total of 25 minutes face to face with the patient.  Over 50% of the time spent counseling and educating the patient about all of the above.      Bria Durán MD

## 2021-06-12 NOTE — PROGRESS NOTES
ASSESSMENT AND PLAN:  Sierra Funk 46 y.o. female is seen here on 08/17/17 for psoriatic arthritis, psoriasis, polyarthralgias, she has tolerated the increased dose of methotrexate.  There has been improvement in her joint pains.  There is still is discrepancy between how she reports the pain and how little objective evidence of inflammation of the joint is seen today.  I have asked her to stay the course with the methotrexate as now.  Continue folic acid.  She is not sexually active neither plans to or  plans to have children.  She is planning to go to her primary physician to inquire into the contraception further.  She is aware that the methotrexate on board pregnancy is absolutely contraindicated.  She is due for her labs.  She can certainly take acetaminophen over-the-counter on as-needed basis.  We will meet here at this time in 4 months with labs every 2 months.      Diagnoses and all orders for this visit:    Psoriatic arthritis  -     methotrexate 2.5 MG tablet; Take 8 tablets (20 mg total) by mouth once a week.  Dispense: 32 tablet; Refill: 1    Psoriasis    High risk medication use    Polyarthralgia    HISTORY OF PRESENTING ILLNESS:  Sierra Funk, 46 y.o., female is here for psoriatic arthritis and evaluation of ongoing polyarthralgias.  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  She reports 2 years long history of joint pains having started in the right foot where she was found to have arthropathy of the metacarpophalangeal joints.. It a runny nose, she has a history of difficulty swallowing.  She gets and that his dizziness and headaches.  She was on birth control pills but that caused her too much moodiness. She does not seem to have had dactylitis.  No ocular involvement.  She is on methotrexate 15 mg for the past 2 years.  She has tolerated this well.  Recent labs are reviewed within acceptable range.  She is on folic acid.  She noted that she still has residual  pain.  She points to the DIPs and PIPs as well as the MTPs.  This is associated with pain level of moderate severity.  This is more when she is active.  She rated this 5.5/10.  Morning stiffness is 30 minutes.  She has noted fatigue and generalized weakness, she has had itching of the eyes ringing in the ears.  She used birth control pills and that caused her effective issues.  Currently there is only using condoms.  There is no family history of psoriatic arthritis, psoriasis inflammatory bowel disease rheumatoid arthritis mom has arthritis of unknown determination.  Further historical information and ADL limitations as noted in the multidimensional health assessment questionnaire attached in the EMR.     ALLERGIES:Latex, natural rubber    PAST MEDICAL/ACTIVE PROBLEMS/MEDICATION/ FAMILY HISTORY/SOCIAL DATA:  The patient has a family history of  Past Medical History:   Diagnosis Date     Vitamin D deficiency      History   Smoking Status     Never Smoker   Smokeless Tobacco     Never Used     Comment: No exposure     Patient Active Problem List   Diagnosis     Psoriasis     Obesity     Menorrhagia     PMS (premenstrual syndrome)     Allergic rhinitis     Mixed hyperlipidemia     Headache, chronic daily     Rosacea     Vitamin D deficiency     Neoplasm of uncertain behavior     Depression, major, recurrent, mild     Carpal tunnel syndrome, bilateral     High risk medication use     Psoriatic arthritis     Polyarthralgia     Current Outpatient Prescriptions   Medication Sig Dispense Refill     aspirin-acetaminophen-caffeine (MIGRAINE RELIEF) 250-250-65 mg per tablet Take 1 tablet by mouth every 6 (six) hours as needed for pain.       desonide (DESOWEN) 0.05 % cream Apply to affected area 2 times daily 60 g 5     ergocalciferol (VITAMIN D2) 50,000 unit capsule Take 1 capsule (50,000 Units total) by mouth once a week. 12 capsule 1     FLUoxetine (PROZAC) 10 MG capsule Take 9 capsules (90 mg total) by mouth daily. 30  capsule 3     folic acid (FOLVITE) 1 MG tablet Take 1 tablet (1 mg total) by mouth daily. 30 tablet 11     lidocaine-prilocaine (EMLA) cream Apply topically to affected areas as needed.  For external use only. 30 g 0     methotrexate 2.5 MG tablet Take 8 tablets (20 mg total) by mouth once a week. 32 tablet 1     omega 3-dha-epa-fish oil (FISH OIL) 1,600-500-800 mg/5 mL Liqd Take by mouth.       tranexamic acid 650 mg Tab HALF A TABLET TWICE A DAY AS NEEDED FOR HEAVY PERIODS 30 tablet 4     No current facility-administered medications for this visit.      DETAILED EXAMINATION  08/17/17  :  Vitals:    08/17/17 1045   BP: 104/58   Patient Site: Right Arm   Patient Position: Sitting   Cuff Size: Adult Regular   Pulse: 68   Weight: 186 lb 1.6 oz (84.4 kg)   Height: 5' (1.524 m)     Alert oriented. Head including the face is examined for malar rash, heliotropes, scarring, lupus pernio. Eyes examined for redness such as in episcleritis/scleritis, periorbital lesions.   Neck examined  for lymph nodes, range of motion Both upper and lower extremities (all four) examined for swollen, warm &/or  tender joints, range of motion, rash, muscle weakness, edema. The salient normal / abnormal findings are appended.    She has tenderness in the DIPs, the right second MTP.  There is a hint of i.e. subtle swelling of the left second toe raising the question of chronic dactylitis. This is not tender.  She does not have enthesitis such as a Achilles, costochondral, iliac.  She has nail pitting and onycholysis.  She has psoriatic lesion such as on her elbows.  LAB / IMAGING DATA:  ALT   Date Value Ref Range Status   06/20/2017 19 0 - 45 U/L Final   04/12/2017 20 0 - 45 U/L Final   03/08/2017 16 0 - 45 U/L Final     Albumin   Date Value Ref Range Status   06/20/2017 3.9 3.5 - 5.0 g/dL Final   04/12/2017 4.1 3.5 - 5.0 g/dL Final   03/08/2017 4.1 3.5 - 5.0 g/dL Final     Creatinine   Date Value Ref Range Status   06/20/2017 0.66 0.60 - 1.10  mg/dL Final   04/12/2017 0.66 0.60 - 1.10 mg/dL Final   03/08/2017 0.69 0.60 - 1.10 mg/dL Final       WBC   Date Value Ref Range Status   06/20/2017 6.9 4.0 - 11.0 thou/uL Final   04/12/2017 6.2 4.0 - 11.0 thou/uL Final     Hemoglobin   Date Value Ref Range Status   06/20/2017 13.3 12.0 - 16.0 g/dL Final   04/12/2017 14.2 12.0 - 16.0 g/dL Final   03/08/2017 13.6 12.0 - 16.0 g/dL Final     Platelets   Date Value Ref Range Status   06/20/2017 200 140 - 440 thou/uL Final   04/12/2017 237 140 - 440 thou/uL Final   03/08/2017 224 140 - 440 thou/uL Final       Lab Results   Component Value Date    RF <15.0 04/12/2017    SEDRATE 4 03/08/2017

## 2021-06-14 NOTE — PROGRESS NOTES
1. Excessive ear wax, bilateral     2. Vitamin D deficiency  Vitamin D, Total (25-Hydroxy)   3. Obesity       Med list reconciled    ASSESSMENT/PLAN:     The following high BMI interventions were performed this visit: encouragement to exercise and weight monitoring    1. Excessive ear wax, bilateral    -Wash performed bilaterally    2. Vitamin D deficiency    - Vitamin D, Total (25-Hydroxy)    3. Obesity    -discussed, encouragement to exercise    Patient should return to clinic if she experiences any more ongoing buildup of earwax or ear pain, will update her later in today with results of her vitamin D level.    The visit lasted a total of 25  minutes face to face with the patient.  Over 50% of the time spent counseling and educating the patient about above content.      Carine Javier NP          SUBJECTIVE:  Sierra Funk is a 47 y.o. female presents to have her ears flushed today.  Patient has been experiencing mild hearing loss with discomfort for the last several weeks.  She did have an appointment to see her primary care provider, however, the pain went away so she canceled her appointment.  She does has a history of chronic earwax buildup and has had them flushed in the past at the [facility].  She is also requesting a recheck of her vitamin D level.  Last level checked in March of this year revealed some mild deficiency (26.6). She did complete 12 weeks of the high-dose vitamin D 50,000 units.  She does tend to experience fatigue in the wintertime due to her reduced vitamin D levels.  She is also requesting a refill for her metronidazole gel for her rosacea today.   Chief Complaint   Patient presents with     Follow-up     recheck vitamin D, ear flush         Patient Active Problem List   Diagnosis     Psoriasis     Obesity     Menorrhagia     PMS (premenstrual syndrome)     Allergic rhinitis     Mixed hyperlipidemia     Headache, chronic daily     Rosacea     Vitamin D deficiency      Neoplasm of uncertain behavior     Depression, major, recurrent, mild     Carpal tunnel syndrome, bilateral     High risk medication use     Psoriatic arthritis     Polyarthralgia       Current Outpatient Prescriptions   Medication Sig Dispense Refill     aspirin-acetaminophen-caffeine (MIGRAINE RELIEF) 250-250-65 mg per tablet Take 1 tablet by mouth every 6 (six) hours as needed for pain.       ergocalciferol (VITAMIN D2) 50,000 unit capsule Take 1 capsule (50,000 Units total) by mouth once a week. 12 capsule 1     FLUoxetine (PROZAC) 10 MG capsule Take 9 capsules (90 mg total) by mouth daily. 30 capsule 3     folic acid (FOLVITE) 1 MG tablet Take 1 tablet (1 mg total) by mouth daily. 30 tablet 11     lidocaine-prilocaine (EMLA) cream Apply topically to affected areas as needed.  For external use only. 30 g 0     omega 3-dha-epa-fish oil (FISH OIL) 1,600-500-800 mg/5 mL Liqd Take by mouth.       tranexamic acid 650 mg Tab HALF A TABLET TWICE A DAY AS NEEDED FOR HEAVY PERIODS 30 tablet 4     methotrexate 2.5 MG tablet Take 8 tablets (20 mg total) by mouth once a week. 32 tablet 1     No current facility-administered medications for this visit.        History   Smoking Status     Never Smoker   Smokeless Tobacco     Never Used     Comment: No exposure       REVIEW OF SYSTEMS: Denies fever/chills/sore throat/rhinorrhea/swollen glands/shortness of breath/discomfort of chest/abdominal pain/changes in bowel habits/urinary symptoms/rash.      TOBACCO USE:  History   Smoking Status     Never Smoker   Smokeless Tobacco     Never Used     Comment: No exposure     Social History     Social History     Marital status: Single     Spouse name: N/A     Number of children: 0     Years of education: N/A     Occupational History     Clerical/office work [employer]     Social History Main Topics     Smoking status: Never Smoker     Smokeless tobacco: Never Used      Comment: No exposure     Alcohol use 2.0 oz/week      4 Standard drinks or equivalent per week     Drug use: No     Sexual activity: Yes     Partners: Male     Birth control/ protection: Condom     Other Topics Concern     Not on file     Social History Narrative    Steady BF since 2005         OBJECTIVE:            Vitals:    11/30/17 1008   BP: 102/60   Pulse: 84   Resp: 16   Temp: 97.7  F (36.5  C)   SpO2: 96%     Weight: 182 lb (82.6 kg)  Wt Readings from Last 3 Encounters:   11/30/17 182 lb (82.6 kg)   08/17/17 186 lb 1.6 oz (84.4 kg)   07/31/17 183 lb 7 oz (83.2 kg)     Body mass index is 35.54 kg/(m^2).        Physical Exam:  GENERAL APPEARANCE: A&A, NAD, well hydrated, well nourished  HEAD: atraumatic, no deformity  EYES: PERRL, EOM's intact, no redness or swelling  EARS: TM's normal, gray with nl light reflex after bilateral ear wash performed, both ears impacted prior to flushing  OROPHARYNX: without erythema, no post nasal drainage or thrush  NECK: Supple, without lymphadenopathy, no thyroid mass, no JVD, no bruit  CV: RRR, no M/G/R   LUNGS: CTAB, normal respiratory effort  ABDOMEN: S&NT, no masses, no organomegaly, BS present x4   SKIN:  Normal skin turgor, no lesions/rashes

## 2021-06-15 PROBLEM — M25.50 POLYARTHRALGIA: Status: ACTIVE | Noted: 2017-04-12

## 2021-06-19 NOTE — PROGRESS NOTES
ASSESSMENT AND PLAN:  Sierra Funk 47 y.o. female is seen here on 07/03/18 for follow-up after 12 months.  She had a diagnosis of psoriatic arthritis made elsewhere.  She was on methotrexate.  She has been off it for the past quite some time now given that she was unable to follow-up, unable to have the labs drawn.  As she has mild arthralgias.  More prominent in the knees.  She is not on birth control pill or IUD as she is not sexually active and does not plan to be.  She is going to check her labs today.   It is reassuring that despite not been on MTX she has little objective evidence of inflammatory joint disease.  She is going to have diclofenac for arthralgias.  For now I have asked her to abstain from methotrexate. We talked about the possibility of osteoarthritis such as in the knees.  Will check x-rays there.  She is aware that there is a risk that her on psoriatic arthritis might flareup in which case then we will need to consider options at that point.  Will meet here in 3 months or sooner if needed.  Should she have swelling such as dactylitis I have asked her take pictures on the phone.    Diagnoses and all orders for this visit:    Psoriatic arthritis (H)  -     ALT (SGPT); Standing  -     Albumin; Standing  -     Creatinine; Standing  -     HM2(CBC w/o Differential); Standing  -     C-Reactive Protein  -     Erythrocyte Sedimentation Rate  -     diclofenac (VOLTAREN) 75 MG EC tablet; Take 1 tablet (75 mg total) by mouth 2 (two) times a day.  Dispense: 60 tablet; Refill: 1    Polyarthralgia  -     C-Reactive Protein  -     Erythrocyte Sedimentation Rate  -     diclofenac (VOLTAREN) 75 MG EC tablet; Take 1 tablet (75 mg total) by mouth 2 (two) times a day.  Dispense: 60 tablet; Refill: 1    Psoriasis  -     Ambulatory referral to Dermatology    Other orders  -     Cancel: methotrexate 2.5 MG tablet; Take 8 tablets (20 mg total) by mouth once a week.  Dispense: 32 tablet; Refill: 1      HISTORY OF  PRESENTING ILLNESS:  Sierra Funk, 47 y.o., female is here for psoriatic arthritis and bilateral knee pain, last seen here on 8/17/2017.  She has been off methotrexate now for 2 months.  Her most recent labs are done in January of this year.  She has noted discomfort in her knees with activity, hands such as the DIPs..  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  She reports 2 years long history of joint pains having started in the right foot where she was found to have arthropathy of the metacarpophalangeal joints.. It a runny nose, she has a history of difficulty swallowing.  She gets and that his dizziness and headaches.  She was on birth control pills but that caused her too much moodiness. She does not seem to have had dactylitis.  No ocular involvement.  She is on methotrexate 15 mg for the past 2 years.  She has tolerated this well.  Recent labs are reviewed within acceptable range.  She is on folic acid.  She noted that she still has residual pain.  She points to the DIPs and PIPs as well as the MTPs.  This is associated with pain level of moderate severity.  This is more when she is active.  She rated this 5.5/10.  Morning stiffness is 30 minutes.  She has noted fatigue and generalized weakness, she has had itching of the eyes ringing in the ears.  She used birth control pills and that caused her effective issues.  Currently there is only using condoms.  There is no family history of psoriatic arthritis, psoriasis inflammatory bowel disease rheumatoid arthritis mom has arthritis of unknown determination.  Further historical information and ADL limitations as noted in the multidimensional health assessment questionnaire attached in the EMR.     ALLERGIES:Latex, natural rubber    PAST MEDICAL/ACTIVE PROBLEMS/MEDICATION/ FAMILY HISTORY/SOCIAL DATA:  The patient has a family history of  Past Medical History:   Diagnosis Date     Depression      Excess ear wax      Vitamin D  deficiency      Vitamin D deficiency      History   Smoking Status     Never Smoker   Smokeless Tobacco     Never Used     Comment: No exposure     Patient Active Problem List   Diagnosis     Psoriasis     Obesity     Menorrhagia     PMS (premenstrual syndrome)     Allergic rhinitis     Mixed hyperlipidemia     Headache, chronic daily     Rosacea     Vitamin D deficiency     Neoplasm of uncertain behavior     Depression, major, recurrent, mild (H)     Carpal tunnel syndrome, bilateral     High risk medication use     Psoriatic arthritis (H)     Polyarthralgia     Current Outpatient Prescriptions   Medication Sig Dispense Refill     cholecalciferol, vitamin D3, (VITAMIN D3) 2,000 unit Tab Take 2,000 Units by mouth once.       aspirin-acetaminophen-caffeine (MIGRAINE RELIEF) 250-250-65 mg per tablet Take 1 tablet by mouth every 6 (six) hours as needed for pain.       FLUoxetine (PROZAC) 10 MG capsule Take 9 capsules (90 mg total) by mouth daily. 30 capsule 3     lidocaine-prilocaine (EMLA) cream Apply topically to affected areas as needed.  For external use only. 30 g 0     methotrexate 2.5 MG tablet Take 8 tablets (20 mg total) by mouth once a week. 32 tablet 1     metroNIDAZOLE (METROCREAM) 0.75 % cream APPLY TO FACE TWICE DAILY 45 g 2     omega 3-dha-epa-fish oil (FISH OIL) 1,600-500-800 mg/5 mL Liqd Take by mouth.       tranexamic acid 650 mg Tab HALF A TABLET TWICE A DAY AS NEEDED FOR HEAVY PERIODS 30 tablet 4     No current facility-administered medications for this visit.      DETAILED EXAMINATION  07/03/18  :  Vitals:    07/03/18 1620   BP: 120/82   Patient Site: Right Arm   Patient Position: Sitting   Cuff Size: Adult Regular   Pulse: 64   Weight: 182 lb (82.6 kg)     Alert oriented. Head including the face is examined for malar rash, heliotropes, scarring, lupus pernio. Eyes examined for redness such as in episcleritis/scleritis, periorbital lesions.   Neck/ Face examined for parotid gland swelling, range of  motion of neck.  Left upper and lower and right upper and lower extremities examined for tenderness, swelling, warmth of the appendicular joints, range of motion, edema, rash.  Some of the important findings included: She does not have synovitis in any of the palpable appendical joints of the upper extremities, lower extremities, mild tenderness in the right first MTP, JLT of the knees.  No dactylitis of the toes of the digits.  She does not have onycholysis or nail pitting.  She has psoriatic lesions such as on her elbows.  LAB / IMAGING DATA:  ALT   Date Value Ref Range Status   01/03/2018 22 0 - 45 U/L Final   10/11/2017 18 0 - 45 U/L Final   08/17/2017 17 0 - 45 U/L Final     Albumin   Date Value Ref Range Status   01/03/2018 3.8 3.5 - 5.0 g/dL Final   10/11/2017 3.7 3.5 - 5.0 g/dL Final   08/17/2017 4.0 3.5 - 5.0 g/dL Final     Creatinine   Date Value Ref Range Status   01/03/2018 0.65 0.60 - 1.10 mg/dL Final   10/11/2017 0.70 0.60 - 1.10 mg/dL Final   08/17/2017 0.67 0.60 - 1.10 mg/dL Final       WBC   Date Value Ref Range Status   01/03/2018 6.2 4.0 - 11.0 thou/uL Final   10/11/2017 6.6 4.0 - 11.0 thou/uL Final     Hemoglobin   Date Value Ref Range Status   01/03/2018 13.2 12.0 - 16.0 g/dL Final   10/11/2017 13.2 12.0 - 16.0 g/dL Final   08/17/2017 13.5 12.0 - 16.0 g/dL Final     Platelets   Date Value Ref Range Status   01/03/2018 203 140 - 440 thou/uL Final   10/11/2017 213 140 - 440 thou/uL Final   08/17/2017 217 140 - 440 thou/uL Final       Lab Results   Component Value Date    RF <15.0 04/12/2017    SEDRATE 4 03/08/2017

## 2021-06-19 NOTE — PROGRESS NOTES
Assessment:     1. Routine general medical examination at a health care facility    2. Palpitations    3. Globus sensation    4. Depression, major, recurrent, mild (H)    5. Rosacea    6. Vitamin D deficiency    7. Screening, lipid    8. Psoriasis        Plan:      1. Routine general medical examination at a health care facility  -Routine health maintenance discussion:  No smoking, limited alcohol (7 or less servings per week), 5 fruits/veg servings per day, 200 minutes of exercise per week.  Daily calcium/vitamin D guidelines, bone health, colon cancer screening beginning at age 50.  Accident avoidance, sun screen.     2. Palpitations  -Haven't had any recently, no change in exercise. Discussed this was unlikely cardiac, EKG normal. If recurs she will let me know and we can get an event monitor ordered.   - Electrocardiogram Perform - Clinic  - Magnesium    3. Globus sensation  -Discussed possible etiologies including thyromegaly, reflux, postnasal drip or foreign body.  Updating labs as listed below, and if things are not improving or nothing obvious shows up on lab work consider treating for esophageal reflux.  - Thyroid Cascade  - HM2(CBC w/o Differential)  - Comprehensive Metabolic Panel    4. Depression, major, recurrent, mild (H)  -Currently doing well, will monitor for now and she will follow-up at least by my chart if things start worsening over the fall months and we can consider restarting fluoxetine with follow-up after that    5. Rosacea  -Doing well with metronidazole cream, renewing today  - metroNIDAZOLE (METROCREAM) 0.75 % cream; APPLY TO FACE TWICE DAILY  Dispense: 45 g; Refill: 2    6. Vitamin D deficiency  -History of vitamin D deficiency, updating level today  - Vitamin D, Total (25-Hydroxy)    7. Screening, lipid  - Lipid Cascade    8. Psoriasis  -Desonide did not seem to be working as well as summer, will have her trial triamcinolone ointment instead.  - triamcinolone (KENALOG) 0.1 % ointment;  Apply small amount to affected 2-3 times daily until gone  Dispense: 30 g; Refill: 1       Subjective:      Sierra Funk is a 47 y.o. female who presents for an annual exam. The patient is not currently sexually active. The patient participates in regular exercise: yes. The patient reports that there is not domestic violence in her life.     Generally doing well. She does have psoriatic arthritis and stopped her medication because it was giving her chest pains. She was on methotrexate. They were electrical shooting pains down the left side of the chest. This was last fall. She did have some left sided shoulder pain.She did have a time where she felt exhausted as well. She did note that since stopping the medication the pain slowly decreased. She hasn't had anything for the last four months. No change in how well she can exercise. She can exercise a few miles a day a few times a week without difficulty.     She does use ceams for psoriasis and rosacea. The Desonide cream didn't work well in the summer, but it does seem to work ok in the winter. She is wondering about an ointment instead.      She is not taking fluoxetine right now, she stopped this last year. She only took it for a month or so. It may have made her gain weight. She is wondering about restarting this in the fall. She was on 10 mg daily, does find that her mood is worse in the winter. She does feel that this was for anxiety.     She does have some cramping at times in her stomach. She doesn't note if it changes with food. It's not burning in nature. It feels like cramping. Under her sternum. It will last only a short amount of time, usually at night. On ROS she does note that she has a frequent sore throat/and feels a foreign body sensation in her throat at times as well.     Healthy Habits:   Regular Exercise: Yes  Sunscreen Use: Yes  Healthy Diet: Yes  Dental Visits Regularly: Yes  Seat Belt: Yes  Sexually active: No  Self Breast Exam  Monthly:Yes  Hemoccults: N/A  Flex Sig: N/A  Colonoscopy: N/A  Lipid Profile: Yes  Glucose Screen: Yes  Prevention of Osteoporosis: No  Last Dexa: N/A  Guns at Home:  No      Immunization History   Administered Date(s) Administered     Dtap 1997     Hep A, Adult IM (19yr & older) 2015, 10/12/2016     Influenza, inj, historic,unspecified 2006, 11/15/2007, 10/29/2008     Influenza,seasonal quad, PF, 36+MOS 2017     Tdap 2009     Immunization status: up to date and documented.    No exam data present    Gynecologic History  Patient's last menstrual period was 2018 (exact date).  Contraception: none  Last Pap: 3/8/17. Results were: normal  Last mammogram: 3/30/18. Results were: normal      OB History    Para Term  AB Living   0 0 0 0 0 0   SAB TAB Ectopic Multiple Live Births   0 0 0 0              Current Outpatient Prescriptions   Medication Sig Dispense Refill     aspirin-acetaminophen-caffeine (MIGRAINE RELIEF) 250-250-65 mg per tablet Take 1 tablet by mouth every 6 (six) hours as needed for pain.       cholecalciferol, vitamin D3, (VITAMIN D3) 2,000 unit Tab Take 2,000 Units by mouth once.       desonide (DESOWEN) 0.05 % cream Apply topically 2 (two) times a day.       diclofenac (VOLTAREN) 75 MG EC tablet Take 1 tablet (75 mg total) by mouth 2 (two) times a day. 60 tablet 1     lidocaine-prilocaine (EMLA) cream Apply topically to affected areas as needed.  For external use only. 30 g 0     metroNIDAZOLE (METROCREAM) 0.75 % cream APPLY TO FACE TWICE DAILY 45 g 2     omega 3-dha-epa-fish oil (FISH OIL) 1,600-500-800 mg/5 mL Liqd Take by mouth.       tranexamic acid 650 mg Tab HALF A TABLET TWICE A DAY AS NEEDED FOR HEAVY PERIODS 30 tablet 4     FLUoxetine (PROZAC) 10 MG capsule Take 9 capsules (90 mg total) by mouth daily. 30 capsule 3     No current facility-administered medications for this visit.      Past Medical History:   Diagnosis Date     Depression       Excess ear wax      Vitamin D deficiency      Vitamin D deficiency      No past surgical history on file.  Latex, natural rubber  Family History   Problem Relation Age of Onset     Leukemia Father       age 57     Hyperlipidemia Father      Anxiety disorder Father      disabled from work due to this     Hypertension Mother      Hypothyroidism Sister      Hypothyroidism Sister      Heart disease Paternal Grandmother       age 68     Arthritis       Breast cancer Maternal Aunt 62     Social History     Social History     Marital status: Single     Spouse name: N/A     Number of children: 0     Years of education: N/A     Occupational History     Clerical/office work [employer]     Social History Main Topics     Smoking status: Never Smoker     Smokeless tobacco: Never Used      Comment: No exposure     Alcohol use 2.0 oz/week     4 Standard drinks or equivalent per week     Drug use: No     Sexual activity: Yes     Partners: Male     Birth control/ protection: Condom     Other Topics Concern     Not on file     Social History Narrative    Steady BF since        Review of Systems  Review of Systems   Grossly positive, see scanned form.     Objective:         Vitals:    18 0950   BP: 118/80   Pulse: 68   Temp: 98  F (36.7  C)   TempSrc: Oral   SpO2: 98%   Weight: 179 lb 11.2 oz (81.5 kg)   Height: 5' (1.524 m)     Body mass index is 35.1 kg/(m^2).    Physical  Physical Exam   Gen: Well developed, well nourished, no acute distress.  HEENT: normocephalic/atraumatic, PERRLA/EOMI, TMs: Gray, normal light reflex, no nasal discharge.  Oral mucosa: no erythema/exudate  Neck: No LAD/masses/thyromegaly  Lungs: clear bilaterally  Heart: regular rate and rhythm, no murmurs/gallops/rubs  Breasts: symmetric, no masses/skin changes, nipple discharge, or axillary LAD.  BSE reviewed.  Abdomen: Normal bowel sounds, soft, non-tender, non-distended, no masses, neg Allen's/McBurney's, no  rebound/guarding  Genital: deferred, up to date  Lymphatics: no supraclavicular/axillary/epitrochlear/inguinal LAD. No edema.  Neuro: A&O x 3, CN II-XII intact, strength 5/5, reflexes symmetric, sensory intact to light touch.  Psych: Behavior appropriate, engaging.  Thought processes congruent, non-tangential.  Musculoskeletal: no gross deformities.  Skin: no rashes or lesions.    Results for orders placed or performed in visit on 07/20/18   Thyroid Cascade   Result Value Ref Range    TSH 1.40 0.30 - 5.00 uIU/mL   HM2(CBC w/o Differential)   Result Value Ref Range    WBC 6.2 4.0 - 11.0 thou/uL    RBC 4.86 3.80 - 5.40 mill/uL    Hemoglobin 14.0 12.0 - 16.0 g/dL    Hematocrit 42.7 35.0 - 47.0 %    MCV 88 80 - 100 fL    MCH 28.9 27.0 - 34.0 pg    MCHC 32.8 32.0 - 36.0 g/dL    RDW 13.0 11.0 - 14.5 %    Platelets 216 140 - 440 thou/uL    MPV 6.9 (L) 7.0 - 10.0 fL   Comprehensive Metabolic Panel   Result Value Ref Range    Sodium 140 136 - 145 mmol/L    Potassium 4.5 3.5 - 5.0 mmol/L    Chloride 106 98 - 107 mmol/L    CO2 25 22 - 31 mmol/L    Anion Gap, Calculation 9 5 - 18 mmol/L    Glucose 88 70 - 125 mg/dL    BUN 12 8 - 22 mg/dL    Creatinine 0.72 0.60 - 1.10 mg/dL    GFR MDRD Af Amer >60 >60 mL/min/1.73m2    GFR MDRD Non Af Amer >60 >60 mL/min/1.73m2    Bilirubin, Total 0.4 0.0 - 1.0 mg/dL    Calcium 9.5 8.5 - 10.5 mg/dL    Protein, Total 7.0 6.0 - 8.0 g/dL    Albumin 4.4 3.5 - 5.0 g/dL    Alkaline Phosphatase 62 45 - 120 U/L    AST 17 0 - 40 U/L    ALT 21 0 - 45 U/L   Lipid Cascade   Result Value Ref Range    Cholesterol 238 (H) <=199 mg/dL    Triglycerides 156 (H) <=149 mg/dL    HDL Cholesterol 42 (L) >=50 mg/dL    LDL Calculated 165 (H) <=129 mg/dL    Patient Fasting > 8hrs? Yes    Vitamin D, Total (25-Hydroxy)   Result Value Ref Range    Vitamin D, Total (25-Hydroxy) 28.5 (L) 30.0 - 80.0 ng/mL   Magnesium   Result Value Ref Range    Magnesium 2.4 1.8 - 2.6 mg/dL   Electrocardiogram Perform - Clinic   Result Value  Ref Range    SYSTOLIC BLOOD PRESSURE  mmHg    DIASTOLIC BLOOD PRESSURE  mmHg    VENTRICULAR RATE 59 BPM    ATRIAL RATE 59 BPM    P-R INTERVAL 134 ms    QRS DURATION 64 ms    Q-T INTERVAL 436 ms    QTC CALCULATION (BEZET) 431 ms    P Axis 3 degrees    R AXIS 4 degrees    T AXIS 22 degrees    MUSE DIAGNOSIS       Sinus bradycardia  Otherwise normal ECG  No previous ECGs available  Confirmed by WILFRED SHULTZ MD LOC:[facility] ([id number]) on 7/20/2018 4:18:59 PM

## 2021-06-19 NOTE — LETTER
Letter by Donna Brown MD at      Author: Donna Brown MD Service: -- Author Type: --    Filed:  Encounter Date: 10/9/2019 Status: Signed         Sierra LEONOR Blessing  [address] [facility] [address]               October 9, 2019    Dear Sierra:    Our records indicate that you are due for a mammogram.    In the [place], one in nine women will develop breast cancer during their lifetime. While there is no way to prevent breast cancer, early detection provides the best opportunity for curing it.    For women over the age of 40, the American Cancer Society recommends a yearly clinical breast exam and a yearly mammogram. These practices have saved thousands of lives. We need your help to ensure that you are receiving optimal medical care.    Please make an appointment for a mammogram at your earliest convenience. [phone]    Sincerely,        Donna Brown MD

## 2021-06-20 NOTE — PROGRESS NOTES
ASSESSMENT AND PLAN:  Sierra Funk 48 y.o. female is seen here on 10/08/18 for follow-up for follow-up, polyarthralgias background of psoriasis, at one point a question of psoriatic arthritis made diagnosis elsewhere was on methotrexate.  She has had more discomfort in the DIPs PIPs.  The first MTP on the right side has been bothersome.  She is still in the reproductive phase of her life.  She is no longer on any birth control measures.  She is not sexually active.  Today we had a long discussion.  I have outlined to her the key question to try and answer is if she has inflammatory/psoriatic arthropathy or beginning of osteoarthritis.  On balance, with ongoing observation I would suggest that she stays on nonsteroidals.  She did not find diclofenac helpful she is going to try the naproxen.  Prescription for this is given major side effects outlined.  Will meet here in the next 2-3 months or sooner should she notice any swelling in any of her joints.  In that case she will also take pictures.      Diagnoses and all orders for this visit:    Polyarthralgia  -     naproxen (NAPROSYN) 500 MG tablet; Take 1 tablet (500 mg total) by mouth 2 (two) times a day with meals.  Dispense: 60 tablet; Refill: 2    Psoriasis      HISTORY OF PRESENTING ILLNESS:  Sierra Funk, 48 y.o., female is here for follow-up of polyarthralgias, background of psoriasis, and elsewhere made the diagnosis of psoriatic arthritis.  She has noted discomfort.  This is in her first MTP on the right side this is the more painful joint in the whole system she rates that as moderately severe worse with activity.  She is also noted DIP PIP area discomfort.  She rates as moderately severe.  Diclofenac did not help.  He has noted morning stiffness of 30 minutes.  There is no fever weight loss mouth ulcers cough.  Her psoriasis is more prominent now.  She noted difficulty performing some of the day-to-day activities.  She did not find diclofenac  helpful.  She wonders if she should go back on methotrexate.  She is no longer on birth control pills or IUD.  She describes herself is not sexually active.  She is getting irregular menstrual cycles.  The birth control pill she was on as discontinued because she was getting mood swings.Until recently using condoms.  There is no family history of psoriatic arthritis, psoriasis inflammatory bowel disease rheumatoid arthritis mom has arthritis of unknown determination.  Further historical information and ADL limitations as noted in the multidimensional health assessment questionnaire attached in the EMR.     ALLERGIES:Latex, natural rubber    PAST MEDICAL/ACTIVE PROBLEMS/MEDICATION/ FAMILY HISTORY/SOCIAL DATA:  The patient has a family history of  Past Medical History:   Diagnosis Date     Depression      Excess ear wax      Vitamin D deficiency      Vitamin D deficiency      History   Smoking Status     Never Smoker   Smokeless Tobacco     Never Used     Comment: No exposure     Patient Active Problem List   Diagnosis     Psoriasis     Obesity     Menorrhagia     PMS (premenstrual syndrome)     Allergic rhinitis     Mixed hyperlipidemia     Headache, chronic daily     Rosacea     Vitamin D deficiency     Neoplasm of uncertain behavior     Depression, major, recurrent, mild (H)     Carpal tunnel syndrome, bilateral     High risk medication use     Psoriatic arthritis (H)     Polyarthralgia     Current Outpatient Prescriptions   Medication Sig Dispense Refill     aspirin-acetaminophen-caffeine (MIGRAINE RELIEF) 250-250-65 mg per tablet Take 1 tablet by mouth every 6 (six) hours as needed for pain.       cholecalciferol, vitamin D3, (VITAMIN D3) 2,000 unit Tab Take 2,000 Units by mouth once.       metroNIDAZOLE (METROCREAM) 0.75 % cream APPLY TO FACE TWICE DAILY 45 g 2     omega 3-dha-epa-fish oil (FISH OIL) 1,600-500-800 mg/5 mL Liqd Take by mouth.       lidocaine-prilocaine (EMLA) cream Apply topically to affected  areas as needed.  For external use only. 30 g 0     tranexamic acid 650 mg Tab HALF A TABLET TWICE A DAY AS NEEDED FOR HEAVY PERIODS 30 tablet 4     triamcinolone (KENALOG) 0.1 % ointment Apply small amount to affected 2-3 times daily until gone 30 g 1     No current facility-administered medications for this visit.      DETAILED EXAMINATION  10/08/18  :  Vitals:    10/08/18 0921   BP: 116/64   Weight: 180 lb 3.2 oz (81.7 kg)   Height: 5' (1.524 m)     Alert oriented. Head including the face is examined for malar rash, heliotropes, scarring, lupus pernio. Eyes examined for redness such as in episcleritis/scleritis, periorbital lesions.   Neck/ Face examined for parotid gland swelling, range of motion of neck.  Left upper and lower and right upper and lower extremities examined for tenderness, swelling, warmth of the appendicular joints, range of motion, edema, rash.  Some of the important findings included: She does not have synovitis in any of the palpable appendicular joints of the upper extremities, first MTP tenderness, right side.  She does not have nail pitting onycholysis.  She has psoriatic lesions on the palms.  She has tenderness in the DIPs and PIPs which is minimal.    She has psoriatic lesions such as on her elbows.  LAB / IMAGING DATA:  ALT   Date Value Ref Range Status   07/20/2018 21 0 - 45 U/L Final   01/03/2018 22 0 - 45 U/L Final   10/11/2017 18 0 - 45 U/L Final     Albumin   Date Value Ref Range Status   07/20/2018 4.4 3.5 - 5.0 g/dL Final   01/03/2018 3.8 3.5 - 5.0 g/dL Final   10/11/2017 3.7 3.5 - 5.0 g/dL Final     Creatinine   Date Value Ref Range Status   07/20/2018 0.72 0.60 - 1.10 mg/dL Final   01/03/2018 0.65 0.60 - 1.10 mg/dL Final   10/11/2017 0.70 0.60 - 1.10 mg/dL Final       WBC   Date Value Ref Range Status   07/20/2018 6.2 4.0 - 11.0 thou/uL Final   01/03/2018 6.2 4.0 - 11.0 thou/uL Final     Hemoglobin   Date Value Ref Range Status   07/20/2018 14.0 12.0 - 16.0 g/dL Final    01/03/2018 13.2 12.0 - 16.0 g/dL Final   10/11/2017 13.2 12.0 - 16.0 g/dL Final     Platelets   Date Value Ref Range Status   07/20/2018 216 140 - 440 thou/uL Final   01/03/2018 203 140 - 440 thou/uL Final   10/11/2017 213 140 - 440 thou/uL Final       Lab Results   Component Value Date    RF <15.0 04/12/2017    SEDRATE 7 07/03/2018

## 2021-06-24 NOTE — PROGRESS NOTES
ASSESSMENT/PLAN:       1. Urinary frequency  -The patient brings up her urinary frequency but then when asked further about it states that she thinks is from her coffee intake.  I did obtain a urinalysis today as well as a urine culture, I recommended considering trying to decrease her coffee intake to see if that helps and if she continues to have issues she should let me know and we can consider further workup including a physical exam which she deferred today.  - Urinalysis Macroscopic  - Culture, Urine    2. Depression, major, recurrent, mild (H)  -Long-standing history of depression and anxiety, given the spironolactone that she is going to start which states that it may worsen her mood as well as her already somewhat borderline mood she would like to restart medication which I think is very reasonable.  She has tolerated 10 mg of fluoxetine in the past without difficulty, will restart today and have her follow-up in 6 weeks for recheck, sooner as necessary.  - FLUoxetine (PROZAC) 10 MG capsule; Take 1 capsule (10 mg total) by mouth daily.  Dispense: 30 capsule; Refill: 2    3. Gastroesophageal reflux disease with esophagitis  -She has had some reflux which is improved with famotidine which she bought over-the-counter. Will send prescription for her to use this as needed and have her follow up as needed.   - famotidine (FOR PEPCID) 10 MG tablet; Take 1 tablet (10 mg total) by mouth 2 (two) times a day as needed for heartburn.  Dispense: 60 tablet; Refill: 2  - HM2(CBC w/o Differential)    4. Vitamin D deficiency  -updating levels today  - Vitamin D, Total (25-Hydroxy)      Return in about 6 weeks (around 4/2/2019) for recheck.      Donna Brown MD      PROGRESS NOTE   2/19/2019    SUBJECTIVE:  Sierra Funk is a 48 y.o. female  who presents for several issues.     She was prescribed Spironolactone by endocrinology for which she has been told was an overactive adrenal gland.  When I asked her  further information about this she says she has been having hair loss which is why she thinks she is taking it.  She was reading side effects and it said that it can worsen depression which she is concerned about because her mood is already somewhat borderline.  She has been on fluoxetine in the past with good results but has been off of it over the last year.  With this medication as well as the season she is wondering if she should restart a low-dose of fluoxetine.  She does have more seasonal issues.    She does wonder about her vitamin D as well. She had her vitamin D level checked and it was 78 at the dermatologist, at the time she had been taking 5000 international units of D3 daily which helped her feel much better from an aching perspective.  With that number she went down to 2000 and they and she felt like she was much more achy again so she is been alternating the 5000 international units with the 2000 international units.  She is wondering what her level looks like now and if she could go up on a higher dose again as she feels better with this.    She has had increased urinary frequency as well. This has been the last year. If she limits her coffee intake she is better. She does walk around like a zombie without the coffee. She does have 2-3 times in a day. HSe does wonder if thi sis due to the coffee. She does have rgency at times. She did start doing the kegel exercises and that has helped as well. She has no bulging in her vaginal area.     She has bad acid reflux and she has started the Pepcid and that has been helping. She is taking them nightly. She doesn't note that it goes away but it helps quite a bit. She does still have symptoms, she was having a constant sore throat, that is gone now. She doesn't wake up choking now, but does still have acid in her mouth now. She doeswonder if she should take two of them.   Chief Complaint   Patient presents with     Medication Management     Patient is here  today to review current medications. Patient was prescribed spironolactone, she has not started it yet as she is nervous about increased depression while on it. Also patient wonders what dose of vitamin d she should be taking, feels better while on 5,000 units.      Urinary Frequency     Patient has noticed a change in urinary frequency for the past year. There is no pain with urination.          Patient Active Problem List   Diagnosis     Psoriasis     Obesity     Menorrhagia     PMS (premenstrual syndrome)     Allergic rhinitis     Mixed hyperlipidemia     Headache, chronic daily     Rosacea     Vitamin D deficiency     Neoplasm of uncertain behavior     Depression, major, recurrent, mild (H)     Carpal tunnel syndrome, bilateral     Polyarthralgia       Current Outpatient Medications   Medication Sig Dispense Refill     aspirin-acetaminophen-caffeine (MIGRAINE RELIEF) 250-250-65 mg per tablet Take 1 tablet by mouth every 6 (six) hours as needed for pain.       biotin 1 mg tablet Take 1,000 mcg by mouth daily.       cholecalciferol, vitamin D3, (VITAMIN D3) 2,000 unit Tab Take 2,000 Units by mouth once.       famotidine (FOR PEPCID) 10 MG tablet Take 1 tablet (10 mg total) by mouth 2 (two) times a day as needed for heartburn. 60 tablet 2     ferrous sulfate 325 (65 FE) MG tablet Take 1 tablet by mouth.       folic acid (FOLVITE) 400 MCG tablet Take 400 mcg by mouth daily.       triamcinolone (KENALOG) 0.1 % ointment Apply small amount to affected 2-3 times daily until gone 30 g 1     FLUoxetine (PROZAC) 10 MG capsule Take 1 capsule (10 mg total) by mouth daily. 30 capsule 2     lidocaine-prilocaine (EMLA) cream Apply topically to affected areas as needed.  For external use only. 30 g 0     metroNIDAZOLE (METROCREAM) 0.75 % cream APPLY TO FACE TWICE DAILY 45 g 2     omega 3-dha-epa-fish oil (FISH OIL) 1,600-500-800 mg/5 mL Liqd Take by mouth.       spironolactone (ALDACTONE) 100 MG tablet Take 100 mg by mouth  daily.  3     tranexamic acid 650 mg Tab HALF A TABLET TWICE A DAY AS NEEDED FOR HEAVY PERIODS 30 tablet 4     No current facility-administered medications for this visit.        Social History     Tobacco Use   Smoking Status Never Smoker   Smokeless Tobacco Never Used   Tobacco Comment    No exposure           OBJECTIVE:        Recent Results (from the past 240 hour(s))   HM2(CBC w/o Differential)   Result Value Ref Range    WBC 8.5 4.0 - 11.0 thou/uL    RBC 4.88 3.80 - 5.40 mill/uL    Hemoglobin 13.9 12.0 - 16.0 g/dL    Hematocrit 43.1 35.0 - 47.0 %    MCV 88 80 - 100 fL    MCH 28.5 27.0 - 34.0 pg    MCHC 32.3 32.0 - 36.0 g/dL    RDW 12.3 11.0 - 14.5 %    Platelets 253 140 - 440 thou/uL    MPV 6.4 (L) 7.0 - 10.0 fL   Urinalysis Macroscopic   Result Value Ref Range    Color, UA Yellow Colorless, Yellow, Straw, Light Yellow    Clarity, UA Cloudy (!) Clear    Glucose, UA Negative Negative    Bilirubin, UA Negative Negative    Ketones, UA Negative Negative    Specific Gravity, UA 1.015 1.005 - 1.030    Blood, UA Negative Negative    pH, UA 8.5 (H) 5.0 - 8.0    Protein, UA Negative Negative mg/dL    Urobilinogen, UA 0.2 E.U./dL 0.2 E.U./dL, 1.0 E.U./dL    Nitrite, UA Negative Negative    Leukocytes, UA Negative Negative       Vitals:    02/19/19 1139   BP: 140/82   Patient Site: Left Arm   Patient Position: Sitting   Cuff Size: Adult Large   Pulse: 74   SpO2: 99%   Weight: 184 lb 11.2 oz (83.8 kg)     Weight: 184 lb 11.2 oz (83.8 kg)          Physical Exam:  GENERAL APPEARANCE: A&A, NAD, well hydrated, well nourished  SKIN:  Normal skin turgor, no lesions/rashes   HEENT: moist mucous membranes, no rhinorrhea  NECK: Normal  CV: RRR, no M/G/R   LUNGS: CTAB  ABDOMEN: S&NT, no masses, no guarding or rebound  EXTREMITY: no edema   NEURO: no gross deficits  Psych: Her affect is anxious, she is casually dressed and groomed this and speech pattern are normal, no obvious hallucinations, eye contact is fair

## 2021-06-26 ENCOUNTER — HEALTH MAINTENANCE LETTER (OUTPATIENT)
Age: 51
End: 2021-06-26

## 2021-08-03 PROBLEM — L40.50 PSORIATIC ARTHRITIS (H): Chronic | Status: RESOLVED | Noted: 2017-02-01 | Resolved: 2018-10-08

## 2021-10-11 ENCOUNTER — HEALTH MAINTENANCE LETTER (OUTPATIENT)
Age: 51
End: 2021-10-11

## 2022-07-17 ENCOUNTER — HEALTH MAINTENANCE LETTER (OUTPATIENT)
Age: 52
End: 2022-07-17

## 2022-09-25 ENCOUNTER — HEALTH MAINTENANCE LETTER (OUTPATIENT)
Age: 52
End: 2022-09-25

## 2023-08-05 ENCOUNTER — HEALTH MAINTENANCE LETTER (OUTPATIENT)
Age: 53
End: 2023-08-05

## 2023-09-11 ENCOUNTER — TRANSFERRED RECORDS (OUTPATIENT)
Dept: HEALTH INFORMATION MANAGEMENT | Facility: CLINIC | Age: 53
End: 2023-09-11